# Patient Record
Sex: MALE | Race: WHITE
[De-identification: names, ages, dates, MRNs, and addresses within clinical notes are randomized per-mention and may not be internally consistent; named-entity substitution may affect disease eponyms.]

---

## 2017-05-23 ENCOUNTER — HOSPITAL ENCOUNTER (OUTPATIENT)
Dept: HOSPITAL 56 - MW.SDS | Age: 77
Discharge: HOME | End: 2017-05-23
Attending: UROLOGY
Payer: OTHER GOVERNMENT

## 2017-05-23 VITALS — DIASTOLIC BLOOD PRESSURE: 70 MMHG | SYSTOLIC BLOOD PRESSURE: 138 MMHG

## 2017-05-23 DIAGNOSIS — C67.9: Primary | ICD-10-CM

## 2017-05-23 PROCEDURE — 52235 CYSTOSCOPY AND TREATMENT: CPT

## 2017-05-23 NOTE — PCM.PREANE
Preanesthetic Assessment





- Anesthesia/Transfusion/Family Hx


Anesthesia History: Prior Anesthesia Without Reaction


Family History of Anesthesia Reaction: No


Transfusion History: No Prior Transfusion(s)





- Review of Systems


General: No Symptoms


Pulmonary: No Symptoms


Cardiovascular: No Symptoms


Gastrointestinal: No symptoms


Neurological: No Symptoms


Other: Reports: None





- Physical Assessment


NPO Status Date: 05/22/17


NPO Status Time: 22:30


O2 Sat by Pulse Oximetry: 97


Respiratory Rate: 16


Vital Signs: 





 Last Vital Signs











Temp  36.8 C   05/23/17 10:20


 


Pulse  60   05/23/17 10:20


 


Resp  16   05/23/17 10:20


 


BP  141/66 H  05/23/17 10:20


 


Pulse Ox  97   05/23/17 10:20











Height: 1.78 m


Weight: 103.419 kg


ASA Class: 2


Mental Status: Alert & Oriented x3


Dentition: Reports: Normal Dentition


Lungs: Clear to auscultation, Normal respiratory effort


Cardiovascular: Regular Rate, Regular Rhythm





- Allergies


Allergies/Adverse Reactions: 


 Allergies











Allergy/AdvReac Type Severity Reaction Status Date / Time


 


No Known Allergies Allergy   Verified 05/18/17 11:41














- Blood


Blood Available: No





- Anesthesia Plan


Pre-Op Medication Ordered: None





- Acknowledgements


Anesthesia Type Planned: General Anesthesia


Pt an Appropriate Candidate for the Planned Anesthesia: Yes


Alternatives and Risks of Anesthesia Discussed w Pt/Guardian: Yes


Pt/Guardian Understands and Agrees with Anesthesia Plan: Yes


Additional Comments: 





problem list:  htn, glaucoma, GERD, DALI (on CPAP x 1 month), seasonal allergies.





PreAnesthesia Questionnaire





- Past Health History


Medical/Surgical History: Denies Medical/Surgical History


HEENT History: Reports: Allergic Rhinitis, Glaucoma


Other HEENT History: wears glasses


Cardiovascular History: Reports: Hypertension


Respiratory History: Reports: None


Gastrointestinal History: Reports: None


Other Genitourinary History: current bladder tumor


Musculoskeletal History: Reports: Fracture


Other Musculoskeletal History: hx of fx thumb


Neurological History: Reports: None


Psychiatric History: Reports: None


Endocrine/Metabolic History: Reports: Obesity/BMI 30+


Hematologic History: Reports: None


Immunologic History: Reports: None


Oncologic (Cancer) History: Reports: None


Dermatologic History: Reports: None





- Past Surgical History


Head Surgeries/Procedures: Reports: None


HEENT Surgical History: Reports: None


Cardiovascular Surgical History: Reports: None


Respiratory Surgical History: Reports: None


GI Surgical History: Reports: Appendectomy


Male  Surgical History: Reports: None


Endocrine Surgical History: Reports: None


Neurological Surgical History: Reports: None


Musculoskeletal Surgical History: Reports: None


Oncologic Surgical History: Reports: None


Dermatological Surgical History: Reports: None





- SUBSTANCE USE


Smoking Status *Q: Former Smoker


Second Hand Smoke Exposure: No


Days Per Week of Alcohol Use: 0


Recreational Drug Use History: No





- HOME MEDS


Home Medications: 


 Home Meds





Ranitidine HCl [Ranitidine] 150 mg PO BEDTIME 06/04/15 [History]


Triamterene/Hydrochlorothiazid [Triamterene-HCTZ 37.5-25 MG] 1 each PO DAILY 06/

04/15 [History]


amLODIPine [Norvasc] 10 mg PO DAILY 06/04/15 [History]


Fish Oil/Omega-3 Fatty Acids [Fish Oil 1,000 MG] 1 gm PO BID 05/18/17 [History]


Fluticasone Propionate [Flonase Allergy Relief] 1 spray NASBOTH DAILY 05/18/17 [

History]


Latanoprost [Xalatan 0.005% Ophth Soln] 1 drop EYEBOTH BEDTIME 05/18/17 [History

]


traZODone HCl [Trazodone HCl] 50 mg PO BEDTIME PRN 05/18/17 [History]











- CURRENT (IN HOUSE) MEDS


Current Meds: 





 Current Medications





Lactated Ringer's (Ringers, Lactated)  1,000 mls @ 100 mls/hr IV ASDIRECTED [facility]


   Last Admin: 05/23/17 10:38 Dose:  100 mls/hr





Discontinued Medications





Cefazolin Sodium/Dextrose 2 gm (/ Premix)  50 mls @ 100 mls/hr IV ONCALL ONE


   Stop: 05/23/17 00:34

## 2017-05-23 NOTE — PCM.POSTAN
POST ANESTHESIA ASSESSMENT





- MENTAL STATUS


Mental Status: alert, oriented





- RESPIRATORY


Respiratory Status: respiratory rate WNL, airway patent





- CARDIOVASCULAR


CV Status: pulse rate WNL, blood pressure stable





- GASTROINTESTINAL


GI Status: no symptoms





- POST OP HYDRATION


Hydration Status: adequate & stable

## 2017-05-23 NOTE — OR
SURGEON:

Hitesh Ash M.D.

 

DATE OF PROCEDURE:  05/23/2017

 

PREOPERATIVE DIAGNOSIS:

Transitional cell carcinoma of the bladder.

 

POSTOPERATIVE DIAGNOSIS:

Transitional cell carcinoma of the bladder.

 

OPERATION:

TURBT.

 

DESCRIPTION OF PROCEDURE:

The patient was given general anesthesia, placed in dorsal lithotomy position,

prepped and draped in sterile drapes.  The 26-Qatari resectoscope was introduced

in the bladder without difficulty.  The tumor was resected in its entirety

including some of the muscle base.  The tumor spread over about 1/4th maybe the

circumference of the bladder neck.  It was affecting the bladder neck, but

mostly behind it on the left side.  It extended about 2 cm across in some

places.  The tumor was resected in its entirety.  Specimen was submitted for

additional investigation of the muscle.  With that done, all bleeding points

were fulgurated.  All specimen was submitted and an 18-Qatari Laboy catheter was

placed in the bladder.  Estimated blood loss under 50 mL.

 

PLAN:

He will be sent home.  He was instructed to remove the catheter in 3 days.  I

will see him again in one week.

 

 

SHAYE / ARLETTE

DD:  05/23/2017 15:00:39

DT:  05/23/2017 20:31:29

Job #:  [id number]

## 2017-11-17 ENCOUNTER — HOSPITAL ENCOUNTER (OUTPATIENT)
Dept: HOSPITAL 56 - MW.SDS | Age: 77
Discharge: HOME | End: 2017-11-17
Attending: SURGERY
Payer: MEDICARE

## 2017-11-17 VITALS — DIASTOLIC BLOOD PRESSURE: 78 MMHG | SYSTOLIC BLOOD PRESSURE: 143 MMHG

## 2017-11-17 DIAGNOSIS — Z87.891: ICD-10-CM

## 2017-11-17 DIAGNOSIS — Z85.51: ICD-10-CM

## 2017-11-17 DIAGNOSIS — H02.834: ICD-10-CM

## 2017-11-17 DIAGNOSIS — G47.30: ICD-10-CM

## 2017-11-17 DIAGNOSIS — H02.831: Primary | ICD-10-CM

## 2017-11-17 DIAGNOSIS — E66.9: ICD-10-CM

## 2017-11-17 DIAGNOSIS — K21.9: ICD-10-CM

## 2017-11-17 DIAGNOSIS — Z79.51: ICD-10-CM

## 2017-11-17 DIAGNOSIS — I10: ICD-10-CM

## 2017-11-17 DIAGNOSIS — Z98.890: ICD-10-CM

## 2017-11-17 DIAGNOSIS — Z79.899: ICD-10-CM

## 2017-11-17 PROCEDURE — 93005 ELECTROCARDIOGRAM TRACING: CPT

## 2017-11-17 PROCEDURE — 15823 BLEPHARP UPR EYELID XCSV SKN: CPT

## 2017-11-17 NOTE — PCM.POSTAN
POST ANESTHESIA ASSESSMENT





- MENTAL STATUS


Mental Status: Alert, Oriented





- VITAL SIGNS


Pulse Rate: 71


SaO2: 95 (room air)


Resp Rate: 18


Blood Pressure: 119/86





- RESPIRATORY


Respiratory Status: Respiratory Rate WNL, Airway Patent, O2 Saturation Stable





- CARDIOVASCULAR


CV Status: Pulse Rate WNL, Blood Pressure Stable





- GASTROINTESTINAL


GI Status: No Symptoms





- PAIN


Pain Score: 0





- POST OP HYDRATION


Hydration Status: Adequate & Stable

## 2017-11-17 NOTE — PCM.PREANE
Preanesthetic Assessment





- Anesthesia/Transfusion/Family Hx


Anesthesia History: Prior Anesthesia Without Reaction


Transfusion History: No Prior Transfusion(s)





- Review of Systems


General: No Symptoms


Pulmonary: No Symptoms


Cardiovascular: No Symptoms


Gastrointestinal: No Symptoms


Neurological: No Symptoms


Other: Reports: None





- Physical Assessment


NPO Status Date: 11/17/17


NPO Status Time: 00:01


Height: 5 ft 10 in


Weight: 102.058 kg


ASA Class: 2


Mental Status: Alert & Oriented x3


Airway Class: Mallampati = 2


Dentition: Reports: Normal Dentition


Thyro-Mental Finger Breadths: 3


Mouth Opening Finger Breadths: 3


ROM/Head Extension: Full


Lungs: Clear to Auscultation, Normal Respiratory Effort


Cardiovascular: Regular Rate, Regular Rhythm





- Allergies


Allergies/Adverse Reactions: 


 Allergies











Allergy/AdvReac Type Severity Reaction Status Date / Time


 


No Known Allergies Allergy   Verified 05/18/17 11:41














- Acknowledgements


Anesthesia Type Planned: General Anesthesia, MAC


Pt an Appropriate Candidate for the Planned Anesthesia: Yes


Alternatives and Risks of Anesthesia Discussed w Pt/Guardian: Yes


Pt/Guardian Understands and Agrees with Anesthesia Plan: Yes





PreAnesthesia Questionnaire





- Past Health History


Medical/Surgical History: Denies Medical/Surgical History


HEENT History: Reports: Allergic Rhinitis, Glaucoma


Other HEENT History: wears glasses


Cardiovascular History: Reports: Hypertension


Respiratory History: Reports: Sleep Apnea


Other Respiratory History: does not use CPAP


Gastrointestinal History: Reports: GERD


Other Genitourinary History: hx bladder cancer


Musculoskeletal History: Reports: Fracture


Other Musculoskeletal History: hx of fx thumb, left shoulder pain


Neurological History: Reports: None


Psychiatric History: Reports: None


Endocrine/Metabolic History: Reports: Obesity/BMI 30+


Hematologic History: Reports: None


Immunologic History: Reports: None


Oncologic (Cancer) History: Reports: Bladder


Dermatologic History: Reports: None





- Past Surgical History


Head Surgeries/Procedures: Reports: None


HEENT Surgical History: Reports: None


Cardiovascular Surgical History: Reports: None


Respiratory Surgical History: Reports: None


GI Surgical History: Reports: Appendectomy


Male  Surgical History: Reports: TURBT-Transurethral Resection of Bladder 

Tumor


Endocrine Surgical History: Reports: None


Neurological Surgical History: Reports: None


Musculoskeletal Surgical History: Reports: None


Oncologic Surgical History: Reports: None


Dermatological Surgical History: Reports: None





- SUBSTANCE USE


Smoking Status *Q: Former Smoker


Second Hand Smoke Exposure: No


Days Per Week of Alcohol Use: 0


Recreational Drug Use History: No





- HOME MEDS


Home Medications: 


 Home Meds





Ranitidine HCl [Ranitidine] 150 mg PO BEDTIME 06/04/15 [History]


Triamterene/Hydrochlorothiazid [Triamterene-HCTZ 37.5-25 MG] 1 each PO DAILY 06/

04/15 [History]


amLODIPine [Norvasc] 10 mg PO DAILY 06/04/15 [History]


Fish Oil/Omega-3 Fatty Acids [Fish Oil 1,000 MG] 2 tab PO BID 05/18/17 [History]


Latanoprost [Xalatan 0.005% Ophth Soln] 1 drop EYEBOTH BEDTIME 05/18/17 [History

]


traZODone HCl [Trazodone HCl] 0.5 tab PO BEDTIME 05/18/17 [History]











- CURRENT (IN HOUSE) MEDS


Current Meds: 





 Current Medications





Cefazolin Sodium/Dextrose 2 gm (/ Premix)  50 mls @ 100 mls/hr IV ONETIME ONE


   Stop: 11/17/17 09:29


Lactated Ringer's (Ringers, Lactated)  1,000 mls @ 125 mls/hr IV ASDIRECTED PRISCILLA


Tobramycin/Dexamethasone (Tobradex Ophth Susp)  2 ml EYEBOTH Q4H PRISCILLA





Discontinued Medications





Bupivacaine HCl/Epinephrine Bitart (Marcaine 0.25%/Epinephrine 1:200,000)  10 

ml INJECT ONETIME ONE


   Stop: 11/17/17 09:01


Bupivacaine HCl/Epinephrine Bitart (Sensorc Mpf 0.25%-Epi 1:954718) Confirm 

Administered Dose 30 mls @ as directed .ROUTE .STK-MED ONE


   Stop: 11/17/17 07:17


Tetracaine (Tetracaine 0.5% Ophth Soln) Confirm Administered Dose 15 ml .ROUTE 

.STK-MED ONE


   Stop: 11/17/17 08:22


Tobramycin/Dexamethasone (Tobradex Ophth Oint) Confirm Administered Dose 3.5 gm 

.ROUTE .STK-MED ONE


   Stop: 11/17/17 08:22

## 2017-11-21 NOTE — OR
SURGEON:

HOLLAND KIDD MD

 

DATE OF PROCEDURE:  11/17/2017

 

PREOPERATIVE DIAGNOSIS:

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

 

POSTOPERATIVE DIAGNOSIS:

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

 

PROCEDURE:

Bilateral blepharoplasties for excess skin weighing down lids.

 

ASSISTANT:

CAYETANO Garcia.

 

INDICATIONS:

Mr. Brewster is a 77-year-old male with bilateral upper lid dermatochalasis.

Risks and benefits of excision of the excess skin were discussed with him.  He

has very good levator function on excursion.  Risks were including, but not

limited to, bleeding, infection, damage to underlying or overlying structures,

possible need for future interventions, possible scarring.  He was in agreement

to proceed.

 

PROCEDURE IN DETAIL:

After informed consent was obtained and placed on the chart, the patient was

brought to the operating theater and laid in the supine position.  After

adequate local MAC anesthetic was obtained, he was prepped and draped, and time-

out was completed to confirm side and site.

 

Once adequately confirmed, attention was then paid to marking of the upper

eyelid skin excess.  He had a significant amount of excess skin and 1.5 cm was

removed bilaterally.  Once adequately removed, marked, and pinch test had been

completed to ensure appropriate tension upon removal.  The area was anesthetized

with 0.25% Marcaine with epinephrine in a field block.  Once adequately

anesthetized, the area was excised in an ellipse in standard fashion and

meticulous hemostasis was obtained.  Hemostasis of the underlying musculature

for contraction was also completed.  Once this was completed, the wound was

closed using a deep Monocryl stitch and a running 6-0 Prolene for the skin.

Steri-strips was placed.  The area was dressed with TobraDex liquid.  The

patient tolerated the procedure well.  All counts and needles were correct at

the end of the case.

 

FOLLOWUP INSTRUCTIONS:

The patient will see us in clinic in 7 to 10 days for suture removal, sooner if

there are any problems, questions, or concerns.

 

 

HEGGTVALENTINA / ARLETTE

DD:  11/21/2017 16:51:44

DT:  11/21/2017 20:55:33

Job #:  [id number]

## 2017-11-21 NOTE — PCM.OPNOTE
- General Post-Op/Procedure Note


Date of Surgery/Procedure: 11/17/17


Operative Procedure(s): bilateral blepharoplasties for excess skin


Pre Op Diagnosis: dermatochalasis bilateral upper lids


Post-Op Diagnosis: Same


Anesthesia Technique: Local, MAC


Primary Surgeon: Carri Delgado


Assistant: Kaci Parra


Complications: None


Condition: Good


Free Text/Narrative:: 





[id number]

## 2018-01-23 ENCOUNTER — HOSPITAL ENCOUNTER (OUTPATIENT)
Dept: HOSPITAL 56 - MW.SDS | Age: 78
Discharge: HOME | End: 2018-01-23
Attending: UROLOGY
Payer: OTHER GOVERNMENT

## 2018-01-23 VITALS — DIASTOLIC BLOOD PRESSURE: 73 MMHG | SYSTOLIC BLOOD PRESSURE: 154 MMHG

## 2018-01-23 DIAGNOSIS — I10: ICD-10-CM

## 2018-01-23 DIAGNOSIS — D41.4: Primary | ICD-10-CM

## 2018-01-23 DIAGNOSIS — Z79.899: ICD-10-CM

## 2018-01-23 DIAGNOSIS — Z90.49: ICD-10-CM

## 2018-01-23 PROCEDURE — 52240 CYSTOSCOPY AND TREATMENT: CPT

## 2018-01-23 NOTE — PCM.PREANE
Preanesthetic Assessment





- Anesthesia/Transfusion/Family Hx


Anesthesia History: Prior Anesthesia Without Reaction


Family History of Anesthesia Reaction: No


Transfusion History: No Prior Transfusion(s)





- Review of Systems


General: No Symptoms


Pulmonary: No Symptoms


Cardiovascular: No Symptoms


Gastrointestinal: No Symptoms


Neurological: No Symptoms


Other: Reports: None





- Physical Assessment


NPO Status Date: 01/22/18


Height: 1.78 m


Weight: 107.048 kg


ASA Class: 2


Mental Status: Alert & Oriented x3


Airway Class: Mallampati = 1


Dentition: Reports: Crown(s)


ROM/Head Extension: Full


Lungs: Clear to Auscultation, Normal Respiratory Effort


Cardiovascular: Regular Rate, Regular Rhythm





- Allergies


Allergies/Adverse Reactions: 


 Allergies











Allergy/AdvReac Type Severity Reaction Status Date / Time


 


No Known Allergies Allergy   Verified 05/18/17 11:41














- Anesthesia Plan


Pre-Op Medication Ordered: None





- Acknowledgements


Anesthesia Type Planned: General Anesthesia


Pt an Appropriate Candidate for the Planned Anesthesia: Yes


Alternatives and Risks of Anesthesia Discussed w Pt/Guardian: Yes


Pt/Guardian Understands and Agrees with Anesthesia Plan: Yes


Additional Comments: 





PMH: significant GERD, HTN, Glaucoma





PreAnesthesia Questionnaire





- Past Health History


Medical/Surgical History: Denies Medical/Surgical History


HEENT History: Reports: Allergic Rhinitis, Glaucoma


Other HEENT History: wears glasses


Cardiovascular History: Reports: Hypertension


Respiratory History: Reports: Sleep Apnea


Other Respiratory History: does not use CPAP


Gastrointestinal History: Reports: GERD


Genitourinary History: Reports: Other (See Below)


Other Genitourinary History: hx bladder cancer


Musculoskeletal History: Reports: Arthritis, Fracture


Other Musculoskeletal History: hx of fx thumb, left shoulder pain


Neurological History: Reports: None


Psychiatric History: Reports: None


Endocrine/Metabolic History: Reports: Obesity/BMI 30+


Hematologic History: Reports: None


Immunologic History: Reports: None


Oncologic (Cancer) History: Reports: Bladder


Dermatologic History: Reports: None





- Past Surgical History


Head Surgeries/Procedures: Reports: None


HEENT Surgical History: Reports: None, Cataract Surgery


Other HEENT Surgeries/Procedures: hx blepharoplasty and cataract surgery


Cardiovascular Surgical History: Reports: None


Respiratory Surgical History: Reports: None


GI Surgical History: Reports: Appendectomy


Male  Surgical History: Reports: TURBT-Transurethral Resection of Bladder 

Tumor


Endocrine Surgical History: Reports: None


Neurological Surgical History: Reports: None


Musculoskeletal Surgical History: Reports: None


Oncologic Surgical History: Reports: None


Dermatological Surgical History: Reports: None





- SUBSTANCE USE


Smoking Status *Q: Former Smoker


Second Hand Smoke Exposure: No


Days Per Week of Alcohol Use: 0


Recreational Drug Use History: No





- HOME MEDS


Home Medications: 


 Home Meds





Ranitidine HCl [Ranitidine] 150 mg PO BEDTIME 06/04/15 [History]


Triamterene/Hydrochlorothiazid [Triamterene-HCTZ 37.5-25 MG] 1 each PO DAILY 06/

04/15 [History]


amLODIPine [Norvasc] 10 mg PO DAILY 06/04/15 [History]


Fish Oil/Omega-3 Fatty Acids [Fish Oil 1,000 MG] 2 tab PO BID 05/18/17 [History]


Latanoprost [Xalatan 0.005% Ophth Soln] 1 drop EYEBOTH BEDTIME 05/18/17 [History

]


traZODone HCl [Trazodone HCl] 0.5 tab PO BEDTIME 05/18/17 [History]











- CURRENT (IN HOUSE) MEDS


Current Meds: 





 Current Medications





Lactated Ringer's (Ringers, Lactated)  1,000 mls @ 100 mls/hr IV ASDIRECTED PRISCILLA


Sodium Chloride (Saline Flush)  10 ml FLUSH ASDIRECTED PRN


   PRN Reason: Keep Vein Open


Sodium Chloride (Saline Flush)  2.5 ml FLUSH ASDIRECTED PRN


   PRN Reason: Keep Vein Open





Discontinued Medications





Cefazolin Sodium/Dextrose 2 gm (/ Premix)  50 mls @ 100 mls/hr IV ONETIME ONE


   Stop: 01/23/18 07:29

## 2018-02-01 NOTE — OR
SURGEON:

Hitesh Ash M.D.

 

DATE OF PROCEDURE:  01/23/2018

 

PREOPERATIVE DIAGNOSIS:

Multiple papillary bladder tumors.

 

POSTOPERATIVE DIAGNOSIS:

Multiple papillary bladder tumors.

 

OPERATION PERFORMED:

TURBT.

 

DESCRIPTION OF PROCEDURE:

The patient was given general anesthesia, was placed in dorsal lithotomy

position, prepped and draped in sterile drapes.  A #26 resectoscope was

introduced in the bladder without difficulty.  All the tumors were resected, and

the specimen was submitted.  The patient tolerated the procedure well and was

moved to recovery room in good condition.

 

 

SHAYE / ARLETTE

DD:  02/01/2018 16:48:48

DT:  02/01/2018 21:12:22

Job #:  [id number]

## 2018-03-06 NOTE — OR
SURGEON:

Hitesh Ash M.D.

 

DATE OF PROCEDURE:  01/23/2018

 

ADDENDUM:

The size of the tumor was large, and the margins were clear.

 

 

SHAYE / ARLETTE

DD:  03/06/2018 10:23:10

DT:  03/06/2018 12:54:13

Job #:  [id number]

## 2018-04-26 ENCOUNTER — HOSPITAL ENCOUNTER (OUTPATIENT)
Dept: HOSPITAL 56 - MW.SDS | Age: 78
Discharge: HOME | End: 2018-04-26
Attending: SURGERY
Payer: MEDICARE

## 2018-04-26 VITALS — SYSTOLIC BLOOD PRESSURE: 140 MMHG | DIASTOLIC BLOOD PRESSURE: 71 MMHG

## 2018-04-26 DIAGNOSIS — Z79.899: ICD-10-CM

## 2018-04-26 DIAGNOSIS — I10: ICD-10-CM

## 2018-04-26 DIAGNOSIS — K21.9: ICD-10-CM

## 2018-04-26 DIAGNOSIS — M19.012: ICD-10-CM

## 2018-04-26 DIAGNOSIS — E66.9: ICD-10-CM

## 2018-04-26 DIAGNOSIS — Z98.890: ICD-10-CM

## 2018-04-26 DIAGNOSIS — C67.9: Primary | ICD-10-CM

## 2018-04-26 DIAGNOSIS — J30.9: ICD-10-CM

## 2018-04-26 DIAGNOSIS — G47.30: ICD-10-CM

## 2018-04-26 DIAGNOSIS — Z90.49: ICD-10-CM

## 2018-04-26 DIAGNOSIS — Z87.891: ICD-10-CM

## 2018-04-26 PROCEDURE — 36561 INSERT TUNNELED CV CATH: CPT

## 2018-04-26 PROCEDURE — 76000 FLUOROSCOPY <1 HR PHYS/QHP: CPT

## 2018-04-26 PROCEDURE — 71045 X-RAY EXAM CHEST 1 VIEW: CPT

## 2018-04-26 NOTE — PCM.PREANE
Preanesthetic Assessment





- Anesthesia/Transfusion/Family Hx


Anesthesia History: Prior Anesthesia Without Reaction


Family History of Anesthesia Reaction: No


Transfusion History: No Prior Transfusion(s)





- Review of Systems


General: No Symptoms


Pulmonary: No Symptoms


Cardiovascular: No Symptoms


Gastrointestinal: No Symptoms


Neurological: No Symptoms


Other: Reports: None





- Physical Assessment


NPO Status Date: 04/25/18


NPO Status Time: 20:00


O2 Sat by Pulse Oximetry: 95


Respiratory Rate: 16


Vital Signs: 





 Last Vital Signs











Temp  36.1 C   04/26/18 08:50


 


Pulse  98   04/26/18 08:50


 


Resp  16   04/26/18 08:50


 


BP  164/91 H  04/26/18 08:50


 


Pulse Ox  95   04/26/18 08:50











Height: 1.75 m


Weight: 97.976 kg


ASA Class: 2


Mental Status: Alert & Oriented x3


Dentition: Reports: Normal Dentition


ROM/Head Extension: Full


Lungs: Clear to Auscultation, Normal Respiratory Effort


Cardiovascular: Regular Rate, Regular Rhythm





- Allergies


Allergies/Adverse Reactions: 


 Allergies











Allergy/AdvReac Type Severity Reaction Status Date / Time


 


No Known Allergies Allergy   Verified 04/24/18 10:18














- Anesthesia Plan


Pre-Op Medication Ordered: None





- Acknowledgements


Anesthesia Type Planned: MAC


Pt an Appropriate Candidate for the Planned Anesthesia: Yes


Alternatives and Risks of Anesthesia Discussed w Pt/Guardian: Yes


Pt/Guardian Understands and Agrees with Anesthesia Plan: Yes





PreAnesthesia Questionnaire





- Past Health History


Medical/Surgical History: Denies Medical/Surgical History


HEENT History: Reports: Cataract, Glaucoma


Other HEENT History: wears glasses


Cardiovascular History: Reports: Hypertension


Respiratory History: Reports: Sleep Apnea


Other Respiratory History: does not use CPAP


Gastrointestinal History: Reports: GERD


Genitourinary History: Reports: Other (See Below)


Other Genitourinary History: hx of bladder tumor


Musculoskeletal History: Reports: Arthritis, Fracture


Other Musculoskeletal History: hx of fx thumb, left shoulder pain


Neurological History: Reports: None


Psychiatric History: Reports: None


Endocrine/Metabolic History: Reports: Obesity/BMI 30+


Hematologic History: Reports: None


Immunologic History: Reports: None


Oncologic (Cancer) History: Reports: Bladder


Dermatologic History: Reports: None





- Past Surgical History


Head Surgeries/Procedures: Reports: None


HEENT Surgical History: Reports: Cataract Surgery, Other (See Below)


Other HEENT Surgeries/Procedures: hx of Blepharoplasty


Cardiovascular Surgical History: Reports: None


Respiratory Surgical History: Reports: None


GI Surgical History: Reports: Appendectomy


Male  Surgical History: Reports: TURBT-Transurethral Resection of Bladder 

Tumor


Endocrine Surgical History: Reports: None


Neurological Surgical History: Reports: None


Musculoskeletal Surgical History: Reports: None


Oncologic Surgical History: Reports: None


Dermatological Surgical History: Reports: None





- SUBSTANCE USE


Smoking Status *Q: Former Smoker


Tobacco Use Within Last Twelve Months: No


Second Hand Smoke Exposure: No


Days Per Week of Alcohol Use: 0


Recreational Drug Use History: No





- HOME MEDS


Home Medications: 


 Home Meds





Ranitidine HCl [Ranitidine] 150 mg PO BEDTIME 06/04/15 [History]


Triamterene/Hydrochlorothiazid [Triamterene-HCTZ 37.5-25 MG] 1 each PO DAILY 06/

04/15 [History]


amLODIPine [Norvasc] 10 mg PO DAILY 06/04/15 [History]


Fish Oil/Omega-3 Fatty Acids [Fish Oil 1,000 MG] 1 tab PO BID 05/18/17 [History]


Latanoprost [Xalatan 0.005% Ophth Soln] 1 drop EYEBOTH BEDTIME 05/18/17 [History

]


traZODone HCl [Trazodone HCl] 25 mg PO BEDTIME PRN 05/18/17 [History]











- CURRENT (IN HOUSE) MEDS


Current Meds: 





 Current Medications





Lactated Ringer's (Ringers, Lactated)  1,000 mls @ 125 mls/hr IV ASDIRECTED PRISCILLA


Sodium Chloride (Saline Flush)  10 ml FLUSH ASDIRECTED PRN


   PRN Reason: Keep Vein Open


Sodium Chloride (Saline Flush)  2.5 ml FLUSH ASDIRECTED PRN


   PRN Reason: Keep Vein Open





Discontinued Medications





Cefazolin Sodium/Dextrose 2 gm (/ Premix)  50 mls @ 100 mls/hr IV ONETIME ONE


   Stop: 04/25/18 09:19

## 2018-04-26 NOTE — PCM.OPNOTE
- General Post-Op/Procedure Note


Date of Surgery/Procedure: 04/26/18


Operative Procedure(s): Right internal jugular port a cath placement


Findings: 


Right internal jugular port a cath placement 


Pre Op Diagnosis: Transitional cell carcinoma


Post-Op Diagnosis: same


Anesthesia Technique: General LMA


Primary Surgeon: Iris Salmon


Fluid Replacement, Intraop: 800


EBL in mLs: 10


Condition: Good

## 2018-04-26 NOTE — [id number]
Post Anesthesia Note





- EVALUATION WITHIN 48HRS OF ANESTHETIC


Vital Signs in Normal Range: Yes


Patient Participated in Evaluation: Yes


Respiratory Function Stable: Yes


Airway Patent: Yes


Cardiovascular Function Stable: Yes


Hydration Status Stable: Yes


Pain Control Satisfactory: Yes


Nausea and Vomiting Control Satisfactory: Yes


Mental Status Recovered: Yes


Resp Rate: 16

## 2018-04-26 NOTE — CR
EXAMINATION: Portable chest radiograph.

 

HISTORY: Port-A-Cath placement.

 

FINDINGS: 

The trachea is midline. The cardiomediastinal silhouette is within normal limits. No pulmonary infilt
rates, effusions or pneumothorax. Right-sided portacatheter noted with tip in the distal SVC. Mild le
ft basilar atelectasis.

 

Medial left scapular margin is not well identified.

 

IMPRESSION: 

Right-sided portacatheter in good position.

## 2018-04-26 NOTE — OR
SURGEON:

ALICIA MATA MD

 

DATE OF PROCEDURE:  04/26/2018

 

PREOPERATIVE DIAGNOSIS:

Transitional cell carcinoma of the bladder.

 

POSTOPERATIVE DIAGNOSIS:

Transitional cell carcinoma of the bladder.

 

PROCEDURE PERFORMED:

Right internal jugular Port-A-Cath placement.

 

ANESTHESIA:

General LMA.

 

FLUIDS:

800 mL crystalloid.

 

ESTIMATED BLOOD LOSS:

10 mL.

 

FINDINGS:

Right internal jugular Port-A-Cath placement.

 

COMPLICATIONS:

None.

 

INDICATIONS:

The patient is a 77-year-old male, who presents with transitional cell

carcinoma.  He has undergone surgery for this and is now undergoing radiation

and chemotherapy treatment.  He is in need of permanent access.  We discussed

the need for a Port-A-Cath.  I explained the procedure, expected perioperative

course, and risks including bleeding, infection, damage to surrounding

structures including hemothorax, pneumothorax, or hemopericardium.  The patient

verbalized understanding and wishes to proceed.

 

PROCEDURE IN DETAIL:

The patient was brought into the operating room and placed on the OR table in

supine position.  A time-out was completed verifying the patient's name, age,

date of birth, allergies, and procedure to be performed.  General LMA anesthesia

was induced.  An ultrasound probe was used to verify the vascular anatomy of the

right side of the neck.  I identified the right internal jugular vein and the

associated right carotid artery.  The neck and right chest were prepped and

draped in usual standard fashion.  I then reverified my anatomy using a sterile

ultrasound probe.  The area overlying the right internal jugular vein was

anesthetized with 1% lidocaine plain.  I anesthetized the catheter tract and

port site on the anterior right chest as well.  A large bore needle was then

placed under direct visualization into the right internal jugular vein.  A brisk

return of venous blood was noted.  A guidewire was then placed down the needle

into the superior vena cava.  The needle was then removed, and the placement of

the guidewire was verified using x-ray.  I then turned my attention to the right

anterior chest.  A 3 cm incision was made along the right anterior chest wall 2

fingerbreadths below the lateral clavicle.  Cautery was used to dissect down the

level of chest wall and create a subcutaneous pocket.  Hemostasis was achieved

with cautery.  The catheter tubing was then tunneled from the subcutaneous port

site up to the insertion site of the guidewire on the right neck.  A vascular

sheath and dilator were then placed over the guidewire and then dilated over the

guidewire under fluoroscopic guidance.  Once this was in an appropriate

position, the internal dilator and guidewire were removed leaving the vascular

sheath in place.  The catheter tubing was then placed through the vascular

sheath into the superior vena cava.  The vascular sheath was then peeled away.

Fluoroscopic guidance was used to ensure that the tip of the catheter was in the

SVC.  The catheter tubing was aspirated and a good return of venous blood was

noted.  The catheter tubing was then flushed with injectable saline.  The

catheter tubing was then trimmed to size and placed on the Port-A-Cath device.

The Port-A-Cath was then placed in the subcutaneous pocket and secured using 3-0

Prolene stitches on either side of it.  The Port-A-Cath device was then

aspirated with a good return of venous blood.  It was locked with 3 mL of

heparinized saline.  The site was then closed with a running 3-0 Vicryl stitch

in the subcutaneous fat layer and the skin was closed with a running 4-0

Monocryl stitch.  Steri-Strips and sterile dressings were applied.  The neck

insertion site was then closed with an interrupted 4-0 Monocryl.  Steri-Strips

and sterile dressings were applied.  The patient tolerated the procedure well

and was taken to PACU in stable condition.  A postoperative chest x-ray showed 
good placement. 

 

 

FERNY CHONG

DD:  04/26/2018 11:48:51

DT:  04/26/2018 14:57:48

Job #:  [id number]

[email or web address]

## 2018-05-11 ENCOUNTER — HOSPITAL ENCOUNTER (EMERGENCY)
Dept: HOSPITAL 56 - MW.ED | Age: 78
LOS: 1 days | Discharge: NURSING, REHAB OR LONG-TERM CARE | End: 2018-05-12
Payer: MEDICARE

## 2018-05-11 DIAGNOSIS — I10: ICD-10-CM

## 2018-05-11 DIAGNOSIS — E66.9: ICD-10-CM

## 2018-05-11 DIAGNOSIS — K21.9: ICD-10-CM

## 2018-05-11 DIAGNOSIS — J05.10: Primary | ICD-10-CM

## 2018-05-11 DIAGNOSIS — D70.9: ICD-10-CM

## 2018-05-11 DIAGNOSIS — R50.81: ICD-10-CM

## 2018-05-11 DIAGNOSIS — Z79.899: ICD-10-CM

## 2018-05-11 PROCEDURE — 85610 PROTHROMBIN TIME: CPT

## 2018-05-11 PROCEDURE — 70360 X-RAY EXAM OF NECK: CPT

## 2018-05-11 PROCEDURE — 86901 BLOOD TYPING SEROLOGIC RH(D): CPT

## 2018-05-11 PROCEDURE — 86850 RBC ANTIBODY SCREEN: CPT

## 2018-05-11 PROCEDURE — 99285 EMERGENCY DEPT VISIT HI MDM: CPT

## 2018-05-11 PROCEDURE — 87186 SC STD MICRODIL/AGAR DIL: CPT

## 2018-05-11 PROCEDURE — 83735 ASSAY OF MAGNESIUM: CPT

## 2018-05-11 PROCEDURE — 81001 URINALYSIS AUTO W/SCOPE: CPT

## 2018-05-11 PROCEDURE — 80053 COMPREHEN METABOLIC PANEL: CPT

## 2018-05-11 PROCEDURE — 96368 THER/DIAG CONCURRENT INF: CPT

## 2018-05-11 PROCEDURE — 83605 ASSAY OF LACTIC ACID: CPT

## 2018-05-11 PROCEDURE — 96375 TX/PRO/DX INJ NEW DRUG ADDON: CPT

## 2018-05-11 PROCEDURE — 36415 COLL VENOUS BLD VENIPUNCTURE: CPT

## 2018-05-11 PROCEDURE — 86900 BLOOD TYPING SEROLOGIC ABO: CPT

## 2018-05-11 PROCEDURE — C9113 INJ PANTOPRAZOLE SODIUM, VIA: HCPCS

## 2018-05-11 PROCEDURE — 87040 BLOOD CULTURE FOR BACTERIA: CPT

## 2018-05-11 PROCEDURE — 96365 THER/PROPH/DIAG IV INF INIT: CPT

## 2018-05-11 PROCEDURE — 71046 X-RAY EXAM CHEST 2 VIEWS: CPT

## 2018-05-11 PROCEDURE — 85025 COMPLETE CBC W/AUTO DIFF WBC: CPT

## 2018-05-11 PROCEDURE — 82150 ASSAY OF AMYLASE: CPT

## 2018-05-11 PROCEDURE — 83690 ASSAY OF LIPASE: CPT

## 2018-05-11 PROCEDURE — 87086 URINE CULTURE/COLONY COUNT: CPT

## 2018-05-11 PROCEDURE — 96361 HYDRATE IV INFUSION ADD-ON: CPT

## 2018-05-11 PROCEDURE — 87088 URINE BACTERIA CULTURE: CPT

## 2018-05-12 VITALS — SYSTOLIC BLOOD PRESSURE: 121 MMHG | DIASTOLIC BLOOD PRESSURE: 63 MMHG

## 2018-05-12 LAB
CHLORIDE SERPL-SCNC: 102 MMOL/L (ref 98–107)
SODIUM SERPL-SCNC: 138 MMOL/L (ref 136–148)

## 2018-05-12 NOTE — EDM.PDOC
ED HPI GENERAL MEDICAL PROBLEM





- General


Stated Complaint: POSSIBLE DEHYDRATION


Time Seen by Provider: 05/11/18 23:55





- History of Present Illness


INITIAL COMMENTS - FREE TEXT/NARRATIVE: 





HISTORY AND PHYSICAL:





History of present illness:


The patient is a 69-year-old male who arrives by EMS with a history of 

hypertension and transitional cell carcinoma of the bladder was followed in our 

oncology clinic and called for transport here because of feeling like he is 

dehydrated. The patient tells me he has had nausea and increased phlegm in his 

mouth which he thinks is thick throughout the day today since he had his 

platelet transfusion this morning. He says he has had intermittent nausea and 

some dry heaves but no vomiting. He says he has not been able to eat or drink 

anything throughout the day today which is why he thinks he is dehydrated. He 

had a low-grade temperature at home of 100.2 but he has not had a cough chest 

pain or shortness of breath and no abdominal pain diarrhea or urinary 

complaints. He tells me that for his cancer he has had 2 bladder surgeries but 

they were not able to completely remove the tumor and he is just started 

radiation therapy to his left scapula as well as chemotherapy but he is unsure 

of the last time he had chemotherapy. The patient tells me he is very thirsty 

but he says it hurts when he swallows and he has the phlegm which is why he is 

not swallowing fluids or food. He says that the discomfort with swallowing and 

the phlegm only started today. When asked him about his progressive decline in 

hemoglobin said that they did not tell him he would need to get another 

transfusion. The patient says that he had these symptoms when he was at the 

oncology center today but they were not as bad and they were aware of this.


The patient has had blood work here at our facility on May 2, May 7, May 8, and 

May 10 which showed a progressive decline in his hemoglobin from 14.5to 9.5 and 

his platelets from 201 to 15. The patient says he was contacted earlier today 

to come in and receive a transfusion of platelets which I see documented in the 

computer. Patient also had a low potassium on 7 May at 2.4 but corrected per 

labs from yesterday. Patient tells nursing that he had a fever while he was in 

the oncology center and was given medication for that. Patient also had recent 

imaging performed April 17 including CT scan of the chest abdomen and pelvis 

and a bone scan which I have reviewed. It demonstrates extensive metastases to 

the left scapular as well as a tiny focus in the left lateral 11th rib but no 

other masses appreciated.


Please note that the patient is currently on Diflucan therapy but he is not 

clear as to why he was placed on that.


Review of systems: 


As per history of present illness and below otherwise all systems reviewed and 

negative.





Past medical history: 


As per history of present illness and as reviewed below otherwise 

noncontributory.





Surgical history: 


As per history of present illness and as reviewed below otherwise 

noncontributory.





Social history: 


No reported history of drug or alcohol abuse.





Family history: 


As per history of present illness and as reviewed below otherwise 

noncontributory.





Physical exam:


General: Well-developed well-nourished overweight male who is nontoxic and 

speaking clearly but intermittently is dry heaving on my evaluation. Most of 

what he is producing looks saliva or phlegm-like and is clear and slightly 

white in color and mucousy in character. His speech is intact and it is not 

muffled hoarse and he is not breathless he is not drooling


HEENT: Atraumatic, normocephalic, pupils reactive, negative for conjunctival 

pallor or scleral icterus, mucous membranes moist, throat clear and there is no 

erythema swelling or abnormality seen in the posterior oropharynx and oral 

cavity, uvula is midline, there is no cervical adenopathy or nuchal rigidity,, 

neck supple, nontender, trachea midline.


Lungs: Clear to auscultation, breath sounds equal bilaterally, chest nontender. 

No work of breathing stridor or wheezing appreciated and good air exchange. At 

the posterior left scapular area there is diffuse erythema and a small area of 

excoriation consistent with his recent radiation therapy to this region


Heart: S1S2, regular, negative for clicks, rubs, or JVD.


Abdomen: Soft, nondistended, nontender. Negative for masses or 

hepatosplenomegaly. Negative for costovertebral tenderness. Bowel sounds are 

hypoactive and there is no tympany on percussion


Pelvis: Stable nontender.


Genitourinary: Deferred.


Rectal: Deferred.


Extremities: Atraumatic, negative for cords or calf pain. Neurovascular 

unremarkable. Full range of motion without defects or deficits


Neuro: Awake, alert, oriented. Cranial nerves II through XII unremarkable. 

Cerebellum unremarkable. Motor and sensory unremarkable throughout. Exam 

nonfocal.





Diagnostics:


CBC CMP amylase lipase INR UA urine culture blood cultures 2 lactic acid 

magnesium level soft tissue neck and chest x-ray





Therapeutics:


Port access IV fluids Zofran GI cocktail Protonix clindamycin Rocephin Decadron





Patient drink the GI cocktail and tolerated it without difficulty.





0203 and 0213: Case was discussed with Dr. Lenz the ENT on-call at [facility] in [place]. I have sent him a picture of the patient's x-ray and he has 

reviewed it. He does feel like the patient does have a mild case of 

epiglottitis but he does not feel that there is any airway compromise and he 

would like me to give Decadron and Rocephin and clindamycin and transfer the 

patient to [place]. He does not feel that the patient needs to be flown and can 

go by ground ambulance. I've also discussed this case with Dr. Jeffries in the ER 

who also accepts the patient


I discussed all these findings with the patient and the need for transfer and 

he is accepting of this. All cultures have been sent off and the patient is 

currently afebrile and resting comfortably handling his secretions. He is aware 

of the need for this emergent transfer. It is also noted in his workup that he 

has a UTI and a urine culture was sent and the [employer] that he is receiving

, Rocephin, should cover his UTI.


Receiving physicians at [facility] are also aware that I am unable to 

access the cancer center records to identify when the patient last had 

chemotherapy.





Critical care  time excluding procedures--31min


Impression: 


Epiglottitis


Neutropenic fever, pancytopenia, UTI


poor by mouth intake with dehydration, nausea and vomiting, history of 

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 

thrombocytopenia with transfusion








Definitive disposition and diagnosis as appropriate pending reevaluation and 

review of above.


  ** throat


Pain Score (Numeric/FACES): 7





- Related Data


 Allergies











Allergy/AdvReac Type Severity Reaction Status Date / Time


 


No Known Allergies Allergy   Verified 05/12/18 00:03











Home Meds: 


 Home Meds





Triamterene/Hydrochlorothiazid [Triamterene-HCTZ 37.5-25 MG] 1 each PO DAILY 06/

04/15 [History]


Fish Oil/Omega-3 Fatty Acids [Fish Oil 1,000 MG] 1 tab PO BID 05/18/17 [History]


Latanoprost [Xalatan 0.005% Ophth Soln] 1 drop EYEBOTH BEDTIME 05/18/17 [History

]


traZODone HCl [Trazodone HCl] 25 mg PO BEDTIME PRN 05/18/17 [History]


Fluconazole [Diflucan] 50 mg PO DAILY 05/12/18 [History]











Past Medical History





- Past Health History


Medical/Surgical History: Denies Medical/Surgical History


HEENT History: Reports: Cataract, Glaucoma


Other HEENT History: wears glasses


Cardiovascular History: Reports: Hypertension


Respiratory History: Reports: Sleep Apnea


Other Respiratory History: does not use CPAP


Gastrointestinal History: Reports: GERD


Genitourinary History: Reports: Other (See Below)


Other Genitourinary History: hx of bladder tumor


Musculoskeletal History: Reports: Arthritis, Fracture


Other Musculoskeletal History: hx of fx thumb, left shoulder pain


Neurological History: Reports: None


Psychiatric History: Reports: None


Endocrine/Metabolic History: Reports: Obesity/BMI 30+


Hematologic History: Reports: None


Immunologic History: Reports: None


Oncologic (Cancer) History: Reports: Bladder


Dermatologic History: Reports: None





- Past Surgical History


Head Surgeries/Procedures: Reports: None


HEENT Surgical History: Reports: Cataract Surgery, Other (See Below)


Other HEENT Surgeries/Procedures: hx of Blepharoplasty


Cardiovascular Surgical History: Reports: None


Respiratory Surgical History: Reports: None


GI Surgical History: Reports: Appendectomy


Male  Surgical History: Reports: TURBT-Transurethral Resection of Bladder 

Tumor


Endocrine Surgical History: Reports: None


Neurological Surgical History: Reports: None


Musculoskeletal Surgical History: Reports: None


Oncologic Surgical History: Reports: None


Dermatological Surgical History: Reports: None





Social & Family History





- Caffeine Use


Caffeine Use: Reports: Coffee, Soda





ED ROS GENERAL





- Review of Systems


Review Of Systems: ROS reveals no pertinent complaints other than HPI.





ED EXAM, GENERAL





- Physical Exam


Exam: See Below (See dictation)





Course





- Vital Signs


Last Recorded V/S: 


 Last Vital Signs











Temp  38.0 C   05/12/18 02:12


 


Pulse  90   05/12/18 02:12


 


Resp  18   05/12/18 02:12


 


BP  121/63   05/12/18 02:12


 


Pulse Ox  92 L  05/12/18 02:12














- Orders/Labs/Meds


Orders: 


 Active Orders 24 hr











 Category Date Time Status


 


 Communication Order [RC] STAT Care  05/12/18 01:35 Active


 


 Chest 2V [CR] Stat Exams  05/12/18 00:11 Taken


 


 Neck Soft Tissue [CR] Stat Exams  05/12/18 00:11 Taken


 


 CULTURE BLOOD [BC] Stat Lab  05/12/18 00:31 Received


 


 CULTURE BLOOD [BC] Stat Lab  05/12/18 00:45 Received


 


 CULTURE URINE [RM] Stat Lab  05/12/18 01:43 Ordered


 


 UA W/MICROSCOPIC [URIN] Stat Lab  05/12/18 01:38 Ordered


 


 Clindamycin Phosphate in D5W [Cleocin in D5W] 900 mg Med  05/12/18 02:24 

Ordered





 Premix Bag 1 bag   





 IV ONETIME   


 


 Sodium Chloride 0.9% [Normal Saline] 1,000 ml Med  05/12/18 01:58 Active





 IV .Bolus   


 


 cefTRIAXone [Rocephin in Dextrose,Iso-Osm 2 GM/50 ML] 2 Med  05/12/18 02:24 

Ordered





 gm   





 Premix Bag 1 bag   





 IV ONETIME   


 


 Blood Culture x2 Reflex Set [OM.PC] Stat Oth  05/12/18 00:11 Ordered








 Medication Orders





Sodium Chloride (Normal Saline)  1,000 mls @ 125 mls/hr IV .Bolus ONE


   Stop: 05/12/18 09:57


   Last Admin: 05/12/18 01:59  Dose: 125 mls/hr


Ceftriaxone Sodium/Dextrose 2 (gm/ Premix)  50 mls @ 100 mls/hr IV ONETIME ONE


   Stop: 05/12/18 02:53


Clindamycin Phosphate 900 mg/ (Premix)  50 mls @ 100 mls/hr IV ONETIME ONE


   Stop: 05/12/18 02:53








Labs: 


 Laboratory Tests











  05/12/18 05/12/18 05/12/18 Range/Units





  00:31 00:31 00:31 


 


WBC  0.67 L    (4.0-11.0)  K/uL


 


RBC  2.86 L    (4.50-5.90)  M/uL


 


Hgb  8.1 L    (13.0-17.0)  g/dL


 


Hct  23.7 L    (38.0-50.0)  %


 


MCV  82.9    (80.0-98.0)  fL


 


MCH  28.3    (27.0-32.0)  pg


 


MCHC  34.2    (31.0-37.0)  g/dL


 


RDW Std Deviation  39.1    (28.0-62.0)  fl


 


RDW Coeff of Rachele  13    (11.0-15.0)  %


 


Plt Count  67 L    (150-400)  K/uL


 


MPV  10.10    (7.40-12.00)  fL


 


Add Manual Diff  YES    


 


Neutrophils % (Manual)  55    (48.0-80.0)  %


 


Band Neutrophils %  1    %


 


Lymphocytes % (Manual)  37    (16.0-40.0)  %


 


Monocytes % (Manual)  7    (0.0-15.0)  %


 


Nucleated RBC %  0.0    /100WBC


 


Absolute Seg Neuts  0.4 L    (1.4-5.7)  


 


Band Neutrophils #  0    


 


Lymphocytes # (Manual)  0.2 L    (0.6-2.4)  


 


Monocytes # (Manual)  0.0    (0.0-0.8)  


 


Nucleated RBCs #  0    K/uL


 


INR   1.08   


 


Lactate    1.3  (0.20-2.00)  mmol/L


 


Sodium     (136-148)  mmol/L


 


Potassium     (3.5-5.1)  mmol/L


 


Chloride     ()  mmol/L


 


Carbon Dioxide     (21.0-32.0)  mmol/L


 


BUN     (7.0-18.0)  mg/dL


 


Creatinine     (0.8-1.3)  mg/dL


 


Est Cr Clr Drug Dosing     mL/min


 


Estimated GFR (MDRD)     ml/min


 


Glucose     ()  mg/dL


 


Calcium     (8.5-10.1)  mg/dL


 


Magnesium     (1.5-2.0)  mg/dL


 


Total Bilirubin     (0.2-1.0)  mg/dL


 


AST     (15-37)  IU/L


 


ALT     (14-63)  IU/L


 


Alkaline Phosphatase     ()  U/L


 


Total Protein     (6.4-8.2)  g/dL


 


Albumin     (3.4-5.0)  g/dL


 


Globulin     (2.0-3.5)  g/dL


 


Albumin/Globulin Ratio     (1.3-2.8)  


 


Amylase     ()  U/L


 


Lipase     ()  U/L


 


Urine Color     


 


Urine Appearance     


 


Urine pH     (5.0-8.0)  


 


Ur Specific Gravity     (1.001-1.035)  


 


Urine Protein     (NEGATIVE)  mg/dL


 


Urine Glucose (UA)     (NEGATIVE)  mg/dL


 


Urine Ketones     (NEGATIVE)  mg/dL


 


Urine Occult Blood     (NEGATIVE)  


 


Urine Nitrite     (NEGATIVE)  


 


Urine Bilirubin     (NEGATIVE)  


 


Urine Urobilinogen     (<2.0)  EU/dL


 


Ur Leukocyte Esterase     (NEGATIVE)  


 


Urine RBC     (0-2/HPF)  


 


Urine WBC     (0-5/HPF)  


 


Ur Epithelial Cells     (NONE-FEW)  


 


Ur Renal Epithelial Cell     


 


Urine Bacteria     (NEGATIVE)  


 


RBC Casts     (NEGATIVE)  


 


Blood Type     


 


Antibody Screen     














  05/12/18 05/12/18 05/12/18 Range/Units





  00:31 00:31 01:38 


 


WBC     (4.0-11.0)  K/uL


 


RBC     (4.50-5.90)  M/uL


 


Hgb     (13.0-17.0)  g/dL


 


Hct     (38.0-50.0)  %


 


MCV     (80.0-98.0)  fL


 


MCH     (27.0-32.0)  pg


 


MCHC     (31.0-37.0)  g/dL


 


RDW Std Deviation     (28.0-62.0)  fl


 


RDW Coeff of Rachele     (11.0-15.0)  %


 


Plt Count     (150-400)  K/uL


 


MPV     (7.40-12.00)  fL


 


Add Manual Diff     


 


Neutrophils % (Manual)     (48.0-80.0)  %


 


Band Neutrophils %     %


 


Lymphocytes % (Manual)     (16.0-40.0)  %


 


Monocytes % (Manual)     (0.0-15.0)  %


 


Nucleated RBC %     /100WBC


 


Absolute Seg Neuts     (1.4-5.7)  


 


Band Neutrophils #     


 


Lymphocytes # (Manual)     (0.6-2.4)  


 


Monocytes # (Manual)     (0.0-0.8)  


 


Nucleated RBCs #     K/uL


 


INR     


 


Lactate     (0.20-2.00)  mmol/L


 


Sodium  138    (136-148)  mmol/L


 


Potassium  3.6    (3.5-5.1)  mmol/L


 


Chloride  102    ()  mmol/L


 


Carbon Dioxide  31.8    (21.0-32.0)  mmol/L


 


BUN  16    (7.0-18.0)  mg/dL


 


Creatinine  1.1    (0.8-1.3)  mg/dL


 


Est Cr Clr Drug Dosing  55.35    mL/min


 


Estimated GFR (MDRD)  > 60.0    ml/min


 


Glucose  131 H    ()  mg/dL


 


Calcium  7.4 L    (8.5-10.1)  mg/dL


 


Magnesium  1.4 L    (1.5-2.0)  mg/dL


 


Total Bilirubin  1.0    (0.2-1.0)  mg/dL


 


AST  19    (15-37)  IU/L


 


ALT  26    (14-63)  IU/L


 


Alkaline Phosphatase  85    ()  U/L


 


Total Protein  5.7 L    (6.4-8.2)  g/dL


 


Albumin  2.6 L    (3.4-5.0)  g/dL


 


Globulin  3.1    (2.0-3.5)  g/dL


 


Albumin/Globulin Ratio  0.8 L    (1.3-2.8)  


 


Amylase  55    ()  U/L


 


Lipase  65 L    ()  U/L


 


Urine Color    YELLOW  


 


Urine Appearance    SLT CLOUDY  


 


Urine pH    7.5  (5.0-8.0)  


 


Ur Specific Gravity    1.015  (1.001-1.035)  


 


Urine Protein    TRACE  (NEGATIVE)  mg/dL


 


Urine Glucose (UA)    NEGATIVE  (NEGATIVE)  mg/dL


 


Urine Ketones    NEGATIVE  (NEGATIVE)  mg/dL


 


Urine Occult Blood    NEGATIVE  (NEGATIVE)  


 


Urine Nitrite    POSITIVE H  (NEGATIVE)  


 


Urine Bilirubin    NEGATIVE  (NEGATIVE)  


 


Urine Urobilinogen    2.0 H  (<2.0)  EU/dL


 


Ur Leukocyte Esterase    SMALL  (NEGATIVE)  


 


Urine RBC    0-3  (0-2/HPF)  


 


Urine WBC    10-15  (0-5/HPF)  


 


Ur Epithelial Cells    RARE  (NONE-FEW)  


 


Ur Renal Epithelial Cell    FEW  


 


Urine Bacteria    4+ H  (NEGATIVE)  


 


RBC Casts    0-1  (NEGATIVE)  


 


Blood Type   O POSITIVE   


 


Antibody Screen   NEGATIVE   











Meds: 


Medications











Generic Name Dose Route Start Last Admin





  Trade Name Freq  PRN Reason Stop Dose Admin


 


Sodium Chloride  1,000 mls @ 125 mls/hr  05/12/18 01:58  05/12/18 01:59





  Normal Saline  IV  05/12/18 09:57  125 mls/hr





  .Bolus ONE   Administration





     





     





     





     


 


Ceftriaxone Sodium/Dextrose 2  50 mls @ 100 mls/hr  05/12/18 02:24  





  gm/ Premix  IV  05/12/18 02:53  





  ONETIME ONE   





     





     





     





     


 


Clindamycin Phosphate 900 mg/  50 mls @ 100 mls/hr  05/12/18 02:24  





  Premix  IV  05/12/18 02:53  





  ONETIME ONE   





     





     





     





     














Discontinued Medications














Generic Name Dose Route Start Last Admin





  Trade Name Freq  PRN Reason Stop Dose Admin


 


Al Hydroxide/Mg Hydroxide 15  0 ml  05/12/18 00:18  05/12/18 00:51





ml/ Metoclopramide HCl 5 mg/  PO  05/12/18 00:19  1 each





Lidocaine HCl 5 ml  ONETIME ONE   Administration





     





     





     





     


 


Dexamethasone  10 mg  05/12/18 02:24  





  Dexamethasone  IVPUSH  05/12/18 02:25  





  ONETIME ONE   





     





     





     





     


 


Sodium Chloride  1,000 mls @ 999 mls/hr  05/12/18 00:11  05/12/18 00:49





  Normal Saline  IV  05/12/18 01:11  999 mls/hr





  STAT ONE   Administration





     





     





     





     


 


Ondansetron HCl  4 mg  05/12/18 00:11  05/12/18 00:51





  Zofran  IVPUSH  05/12/18 00:12  4 mg





  ONETIME ONE   Administration





     





     





     





     


 


Pantoprazole Sodium  80 mg  05/12/18 00:11  05/12/18 00:51





  Protonix Iv***  IVPUSH  05/12/18 00:12  80 mg





  .BOLUS ONE   Administration





     





     





     





     














Departure





- Departure


Time of Disposition: 02:31


Disposition: DC/Tfer to [facility]


Condition: Good


Clinical Impression: 


 Pancytopenia, Neutropenic fever, Epiglottitis








- Discharge Information


Referrals: 


PCP,None [Primary Care Provider] - 





- My Orders


Last 24 Hours: 


My Active Orders





05/12/18 00:11


Chest 2V [CR] Stat 


Neck Soft Tissue [CR] Stat 


Blood Culture x2 Reflex Set [OM.PC] Stat 





05/12/18 00:31


CULTURE BLOOD [BC] Stat 





05/12/18 00:45


CULTURE BLOOD [BC] Stat 





05/12/18 01:35


Communication Order [RC] STAT 





05/12/18 01:38


UA W/MICROSCOPIC [URIN] Stat 





05/12/18 01:43


CULTURE URINE [RM] Stat 





05/12/18 01:58


Sodium Chloride 0.9% [Normal Saline] 1,000 ml IV .Bolus 





05/12/18 02:24


Clindamycin Phosphate in D5W [Cleocin in D5W] 900 mg   Premix Bag 1 bag IV 

ONETIME 


cefTRIAXone [Rocephin in Dextrose,Iso-Osm 2 GM/50 ML] 2 gm   Premix Bag 1 bag 

IV ONETIME 














- Assessment/Plan


Last 24 Hours: 


My Active Orders





05/12/18 00:11


Chest 2V [CR] Stat 


Neck Soft Tissue [CR] Stat 


Blood Culture x2 Reflex Set [OM.PC] Stat 





05/12/18 00:31


CULTURE BLOOD [BC] Stat 





05/12/18 00:45


CULTURE BLOOD [BC] Stat 





05/12/18 01:35


Communication Order [RC] STAT 





05/12/18 01:38


UA W/MICROSCOPIC [URIN] Stat 





05/12/18 01:43


CULTURE URINE [RM] Stat 





05/12/18 01:58


Sodium Chloride 0.9% [Normal Saline] 1,000 ml IV .Bolus 





05/12/18 02:24


Clindamycin Phosphate in D5W [Cleocin in D5W] 900 mg   Premix Bag 1 bag IV 

ONETIME 


cefTRIAXone [Rocephin in Dextrose,Iso-Osm 2 GM/50 ML] 2 gm   Premix Bag 1 bag 

IV ONETIME

## 2018-05-14 NOTE — CR
EXAM DATE: 18



PATIENT'S AGE: 77



Patient: ANJANA PLAZA



Facility: [place], [place]

Patient ID: [id number]

Site Patient ID: [id number].

Site Accession #: [id number]

: 1940

Study: XRay Chest [id number]/12/2018 1:44:56 AM

Ordering Physician: Shae Brown



Final Report: 

INDICATION: Shortness of Breath, Fever, Decreased Fluid Intake



TECHNIQUE:

Chest 2 views



COMPARISON:

2018 



FINDINGS:

Cardiovascular and mediastinum: Heart size and vasculature are normal in 
caliber and appearance. Mediastinum is within normal limits. 



Lungs and pleural spaces: Scarring throughout both lungs most pronounced in the 
left lower lobe. Hyperinflation. Eventration of the right hemidiaphragm. No 
sign of pleural effusion. No pneumothorax. 



Bones: Degenerative changes. 



Other: Right-sided Mediport catheter in place. 



IMPRESSION:

No acute cardiopulmonary disease



Dictated by Lorenzo Honeycutt MD @ 2018 1:53:26 AM





Dictated by: Lorenzo Honeycutt MD @ 2018 01:56:16

(Electronic Signature)





Report Signed by Proxy.
[facility]

## 2018-05-14 NOTE — CR
EXAM DATE: 18



PATIENT'S AGE: 77



Patient: ANJANA PLAZA



Facility: [place], [place]

Patient ID: [id number]

Site Patient ID: [id number].

Site Accession #: [id number]

: 1940

Study: XRay ST Neck [id number]/12/2018 1:43:18 AM

Ordering Physician: Shae Brown



Final Report: 

INDICATION: Throat Pain



TECHNIQUE:

Soft tissue neck 2 view.



COMPARISON:

None.



FINDINGS:

The airway is patent and normal. Epiglottis is thickened. The retropharyngeal 
soft tissues are normal. No obvious masses. The visualized cervical spine 
demonstrates degenerative changes.



IMPRESSION:

Findings are consistent with epiglottitis.



Dictated by: Lorenzo Honeycutt MD @ 2018 01:50:12

(Electronic Signature)



Report Signed by Proxy.
[facility]

## 2019-04-07 ENCOUNTER — HOSPITAL ENCOUNTER (INPATIENT)
Dept: HOSPITAL 56 - MW.ED | Age: 79
LOS: 4 days | Discharge: NURSING, REHAB OR LONG-TERM CARE | DRG: 153 | End: 2019-04-11
Attending: INTERNAL MEDICINE | Admitting: INTERNAL MEDICINE
Payer: MEDICARE
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
## 2019-04-07 DIAGNOSIS — G47.33: ICD-10-CM

## 2019-04-07 DIAGNOSIS — E03.9: ICD-10-CM
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
## 2019-04-07 DIAGNOSIS — K21.9: ICD-10-CM

## 2019-04-07 DIAGNOSIS — Z87.891: ICD-10-CM

## 2019-04-07 DIAGNOSIS — T22.052A: ICD-10-CM

## 2019-04-07 DIAGNOSIS — Z79.899: ICD-10-CM

## 2019-04-07 DIAGNOSIS — E66.9: ICD-10-CM

## 2019-04-07 DIAGNOSIS — R42: ICD-10-CM

## 2019-04-07 DIAGNOSIS — C67.9: ICD-10-CM

## 2019-04-07 DIAGNOSIS — N39.0: ICD-10-CM

## 2019-04-07 DIAGNOSIS — H40.9: ICD-10-CM

## 2019-04-07 DIAGNOSIS — I10: ICD-10-CM

## 2019-04-07 DIAGNOSIS — C79.51: ICD-10-CM

## 2019-04-07 DIAGNOSIS — M19.90: ICD-10-CM

## 2019-04-07 DIAGNOSIS — T70.0XXA: Primary | ICD-10-CM

## 2019-04-07 DIAGNOSIS — T31.0: ICD-10-CM

## 2019-04-07 DIAGNOSIS — Z79.890: ICD-10-CM

## 2019-04-07 DIAGNOSIS — R53.1: ICD-10-CM

## 2019-04-07 LAB
CHLORIDE SERPL-SCNC: 107 MMOL/L (ref 98–107)
SODIUM SERPL-SCNC: 144 MMOL/L (ref 136–148)

## 2019-04-07 PROCEDURE — 99285 EMERGENCY DEPT VISIT HI MDM: CPT

## 2019-04-07 PROCEDURE — 80048 BASIC METABOLIC PNL TOTAL CA: CPT

## 2019-04-07 PROCEDURE — 85610 PROTHROMBIN TIME: CPT

## 2019-04-07 PROCEDURE — 70450 CT HEAD/BRAIN W/O DYE: CPT

## 2019-04-07 PROCEDURE — 93005 ELECTROCARDIOGRAM TRACING: CPT

## 2019-04-07 PROCEDURE — 85025 COMPLETE CBC W/AUTO DIFF WBC: CPT

## 2019-04-07 PROCEDURE — 81001 URINALYSIS AUTO W/SCOPE: CPT

## 2019-04-07 PROCEDURE — 87086 URINE CULTURE/COLONY COUNT: CPT

## 2019-04-07 PROCEDURE — 84484 ASSAY OF TROPONIN QUANT: CPT

## 2019-04-07 PROCEDURE — 36415 COLL VENOUS BLD VENIPUNCTURE: CPT

## 2019-04-07 PROCEDURE — 71045 X-RAY EXAM CHEST 1 VIEW: CPT

## 2019-04-07 PROCEDURE — 80053 COMPREHEN METABOLIC PANEL: CPT

## 2019-04-07 PROCEDURE — 97161 PT EVAL LOW COMPLEX 20 MIN: CPT

## 2019-04-07 RX ADMIN — DEXTROSE SCH UNITS: 10 SOLUTION INTRAVENOUS at 21:24

## 2019-04-07 NOTE — EDM.PDOC
ED HPI GENERAL MEDICAL PROBLEM





- General


Chief Complaint: General


Stated Complaint: AMB


Time Seen by Provider: 04/07/19 14:08


Source of Information: Reports: Patient


History Limitations: Reports: No Limitations





- History of Present Illness


INITIAL COMMENTS - FREE TEXT/NARRATIVE: 


HISTORY AND PHYSICAL:





History of present illness:


Patient is a 78-year-old male who presents to the ED today for increase in 

falls and weakness over the past 7-10 days. Patient states that he states that 

prior to this, he was able to ambulate without difficulty. Patient does live 

alone and does not have anybody to help him home. Patient states that he fell 2 

times today and was not able to get off the floor. Patient states he does have 

a history of bladder cancer and is currently receiving immunotherapy treatment. 

Patient states that he has not been eating and drinking over the past couple 

days due to this generalized weakness and difficulties getting around the house 

by himself.





Patient denies fever, chills, chest pain, shortness of breath, or cough. Denies 

headache, neck stiff ness, change in vision, syncope, or near syncope. Denies 

nausea, vomiting, abdominal pain, diarrhea, constipation, or dysuria. Patient 

has a history of bladder cancer, hypertension, hypothyroid, DALI, and GERD.





Review of systems: 


As per history of present illness and below otherwise all systems reviewed and 

negative.





Past medical history: 


As per history of present illness and as reviewed below otherwise 

noncontributory.





Surgical history: 


As per history of present illness and as reviewed below otherwise 

noncontributory.





Social history: 


See social history for further information





Family history: 


As per history of present illness and as reviewed below otherwise 

noncontributory.





Physical exam:


General: Patient is alert, oriented, and in no acute distress. He is lying 

comfortably on exam table.


HEENT: Atraumatic, normocephalic, pupils equal and reactive bilaterally, 

negative for conjunctival pallor or scleral icterus, mucous membranes dry, TMs 

normal bilaterally, throat clear, neck supple, nontender, trachea midline. No 

drooling or trismus noted. No meningeal signs. No hot potato voice noted. 


Lungs: Clear to auscultation, breath sounds equal bilaterally, chest nontender.


Heart: Heart sounds are distant, so exam of heart is limited. S1S2, regular 

rate and rhythm without overt murmur


Abdomen: Obese, soft, nondistended, nontender. Negative for masses or 

hepatosplenomegaly. Negative for costovertebral tenderness.


Pelvis: Stable nontender.


Genitourinary: Deferred.


Rectal: Deferred.


Skin: Intact, warm, dry. No lesions or rashes noted.


Extremities: Atraumatic, negative for cords or calf pain. Neurovascular 

unremarkable.


Neuro: Awake, alert, oriented. Cranial nerves II through XII unremarkable. 

Cerebellum unremarkable. Motor and sensory unremarkable throughout. Exam 

nonfocal.





Notes:


Will do labs and imaging today.


While with nursing staff, patient is unable to ambulate by himself. He also has 

difficulty sitting up in the bed without assistance. Labs do show patient has a 

urinary tract infection. Patient is unable to perform ADL's at this time.


Dr. Guadarrama was consulted on this patient and will admit to observation.





Diagnostics:


CBC, CMP, UA with culture, PT INR, troponin, chest x-ray, head CT, cardiac 

monitoring, EKG, orthostatic vitals





Therapeutics:


Saline





Impression: 


Urinary tract infection


Generalized weakness


Fall risk





Plan:


1. Admit to observation to Dr. Guadarrama.





Definitive disposition and diagnosis as appropriate pending reevaluation and 

review of above.








- Related Data


 Allergies











Allergy/AdvReac Type Severity Reaction Status Date / Time


 


No Known Allergies Allergy   Verified 04/07/19 13:59











Home Meds: 


 Home Meds





Triamterene/Hydrochlorothiazid [Triamterene-HCTZ 37.5-25 MG] 0.5 tab PO BID 06/

04/15 [History]


Fish Oil/Omega-3 Fatty Acids [Fish Oil 1,000 MG] 1 tab PO BID 05/18/17 [History]


Latanoprost [Xalatan 0.005% Ophth Soln] 1 drop EYEBOTH BEDTIME 05/18/17 [History

]


traZODone HCl [Trazodone HCl] 25 mg PO BEDTIME PRN 05/18/17 [History]


Levothyroxine 25 mcg PO ACBREAKFAST 12/08/18 [History]


oxyCODONE 5 mg PO Q8H PRN 5 Days #15 tab 12/09/18 [Rx]











Past Medical History





- Past Health History


Medical/Surgical History: Denies Medical/Surgical History


HEENT History: Reports: Cataract, Glaucoma


Other HEENT History: wears glasses


Cardiovascular History: Reports: Hypertension


Respiratory History: Reports: Sleep Apnea


Other Respiratory History: does not use CPAP


Gastrointestinal History: Reports: GERD


Genitourinary History: Reports: Other (See Below)


Other Genitourinary History: hx of bladder tumor


Musculoskeletal History: Reports: Arthritis, Fracture


Other Musculoskeletal History: hx of fx thumb, left shoulder pain


Neurological History: Reports: None


Psychiatric History: Reports: None


Endocrine/Metabolic History: Reports: Obesity/BMI 30+


Hematologic History: Reports: None


Immunologic History: Reports: None


Oncologic (Cancer) History: Reports: Bladder


Dermatologic History: Reports: None





- Infectious Disease History


Infectious Disease History: Reports: Chicken Pox, Measles





- Past Surgical History


Head Surgeries/Procedures: Reports: None


HEENT Surgical History: Reports: Cataract Surgery, Other (See Below)


Other HEENT Surgeries/Procedures: hx of Blepharoplasty


Cardiovascular Surgical History: Reports: None


Respiratory Surgical History: Reports: None


GI Surgical History: Reports: Appendectomy


Male  Surgical History: Reports: TURBT-Transurethral Resection of Bladder 

Tumor


Endocrine Surgical History: Reports: None


Neurological Surgical History: Reports: None


Musculoskeletal Surgical History: Reports: None


Oncologic Surgical History: Reports: None


Dermatological Surgical History: Reports: None





Social & Family History





- Family History


Family Medical History: Noncontributory


HEENT: Reports: None





- Tobacco Use


Smoking Status *Q: Former Smoker


Used Tobacco, but Quit: Yes


Month/Year Tobacco Last Used: 1959





- Caffeine Use


Caffeine Use: Reports: Coffee





- Recreational Drug Use


Recreational Drug Use: No





ED ROS GENERAL





- Review of Systems


Review Of Systems: ROS reveals no pertinent complaints other than HPI.





ED EXAM, GENERAL





- Physical Exam


Exam: See Below (see dictation)





Course





- Vital Signs


Last Recorded V/S: 


 Last Vital Signs











Temp  36.2 C   04/07/19 13:59


 


Pulse  87   04/07/19 17:00


 


Resp  18   04/07/19 17:00


 


BP  172/87 H  04/07/19 17:00


 


Pulse Ox  96   04/07/19 17:00








 





Orthostatic Blood Pressure [     149/87


Standing]                        


Orthostatic Blood Pressure [     178/92


Sitting]                         


Orthostatic Blood Pressure [     161/109


Supine]                          











- Orders/Labs/Meds


Orders: 


 Active Orders 24 hr











 Category Date Time Status


 


 Admission Status [Patient Status] [ADT] Stat ADT  04/07/19 17:18 Active


 


 Cardiac Monitoring [RC] .AS DIRECTED Care  04/07/19 14:21 Active


 


 EKG Documentation Completion [RC] STAT Care  04/07/19 14:21 Active


 


 Orthostatic Vital Signs [RC] ASDIRECTED Care  04/07/19 14:24 Active


 


 CULTURE URINE [RM] Stat Lab  04/07/19 16:12 Received











Labs: 


 Laboratory Tests











  04/07/19 04/07/19 04/07/19 Range/Units





  14:49 14:49 14:49 


 


WBC  7.53    (4.0-11.0)  K/uL


 


RBC  4.81    (4.50-5.90)  M/uL


 


Hgb  13.1    (13.0-17.0)  g/dL


 


Hct  39.4    (38.0-50.0)  %


 


MCV  81.9    (80.0-98.0)  fL


 


MCH  27.2    (27.0-32.0)  pg


 


MCHC  33.2    (31.0-37.0)  g/dL


 


RDW Std Deviation  43.2    (28.0-62.0)  fl


 


RDW Coeff of Rachele  15    (11.0-15.0)  %


 


Plt Count  185    (150-400)  K/uL


 


MPV  8.90    (7.40-12.00)  fL


 


Neut % (Auto)  80.9 H    (48.0-80.0)  %


 


Lymph % (Auto)  12.6 L    (16.0-40.0)  %


 


Mono % (Auto)  5.3    (0.0-15.0)  %


 


Eos % (Auto)  0.9    (0.0-7.0)  %


 


Baso % (Auto)  0.3    (0.0-1.5)  %


 


Neut # (Auto)  6.1 H    (1.4-5.7)  K/uL


 


Lymph # (Auto)  1.0    (0.6-2.4)  K/uL


 


Mono # (Auto)  0.4    (0.0-0.8)  K/uL


 


Eos # (Auto)  0.1    (0.0-0.7)  K/uL


 


Baso # (Auto)  0.0    (0.0-0.1)  K/uL


 


Nucleated RBC %  0.0    /100WBC


 


Nucleated RBCs #  0    K/uL


 


INR   1.09   


 


Sodium    144  (136-148)  mmol/L


 


Potassium    3.7  (3.5-5.1)  mmol/L


 


Chloride    107  ()  mmol/L


 


Carbon Dioxide    27.9  (21.0-32.0)  mmol/L


 


BUN    17  (7.0-18.0)  mg/dL


 


Creatinine    0.9  (0.8-1.3)  mg/dL


 


Est Cr Clr Drug Dosing    68.72  mL/min


 


Estimated GFR (MDRD)    > 60.0  ml/min


 


Glucose    100  ()  mg/dL


 


Calcium    9.1  (8.5-10.1)  mg/dL


 


Total Bilirubin    1.0  (0.2-1.0)  mg/dL


 


AST    30  (15-37)  IU/L


 


ALT    21  (14-63)  IU/L


 


Alkaline Phosphatase    135 H  ()  U/L


 


Troponin I    < 0.050  (0.000-0.056)  ng/mL


 


Total Protein    7.3  (6.4-8.2)  g/dL


 


Albumin    3.6  (3.4-5.0)  g/dL


 


Globulin    3.7  (2.6-4.0)  g/dL


 


Albumin/Globulin Ratio    1.0  (0.9-1.6)  


 


Urine Color     


 


Urine Appearance     


 


Urine pH     (5.0-8.0)  


 


Ur Specific Gravity     (1.001-1.035)  


 


Urine Protein     (NEGATIVE)  mg/dL


 


Urine Glucose (UA)     (NEGATIVE)  mg/dL


 


Urine Ketones     (NEGATIVE)  mg/dL


 


Urine Occult Blood     (NEGATIVE)  


 


Urine Nitrite     (NEGATIVE)  


 


Urine Bilirubin     (NEGATIVE)  


 


Urine Ictotest     


 


Urine Urobilinogen     (<2.0)  EU/dL


 


Ur Leukocyte Esterase     (NEGATIVE)  


 


Urine RBC     (0-2/HPF)  


 


Urine WBC     (0-5/HPF)  


 


Ur Epithelial Cells     (NONE-FEW)  


 


Urine Bacteria     (NEGATIVE)  














  04/07/19 Range/Units





  16:12 


 


WBC   (4.0-11.0)  K/uL


 


RBC   (4.50-5.90)  M/uL


 


Hgb   (13.0-17.0)  g/dL


 


Hct   (38.0-50.0)  %


 


MCV   (80.0-98.0)  fL


 


MCH   (27.0-32.0)  pg


 


MCHC   (31.0-37.0)  g/dL


 


RDW Std Deviation   (28.0-62.0)  fl


 


RDW Coeff of Rachele   (11.0-15.0)  %


 


Plt Count   (150-400)  K/uL


 


MPV   (7.40-12.00)  fL


 


Neut % (Auto)   (48.0-80.0)  %


 


Lymph % (Auto)   (16.0-40.0)  %


 


Mono % (Auto)   (0.0-15.0)  %


 


Eos % (Auto)   (0.0-7.0)  %


 


Baso % (Auto)   (0.0-1.5)  %


 


Neut # (Auto)   (1.4-5.7)  K/uL


 


Lymph # (Auto)   (0.6-2.4)  K/uL


 


Mono # (Auto)   (0.0-0.8)  K/uL


 


Eos # (Auto)   (0.0-0.7)  K/uL


 


Baso # (Auto)   (0.0-0.1)  K/uL


 


Nucleated RBC %   /100WBC


 


Nucleated RBCs #   K/uL


 


INR   


 


Sodium   (136-148)  mmol/L


 


Potassium   (3.5-5.1)  mmol/L


 


Chloride   ()  mmol/L


 


Carbon Dioxide   (21.0-32.0)  mmol/L


 


BUN   (7.0-18.0)  mg/dL


 


Creatinine   (0.8-1.3)  mg/dL


 


Est Cr Clr Drug Dosing   mL/min


 


Estimated GFR (MDRD)   ml/min


 


Glucose   ()  mg/dL


 


Calcium   (8.5-10.1)  mg/dL


 


Total Bilirubin   (0.2-1.0)  mg/dL


 


AST   (15-37)  IU/L


 


ALT   (14-63)  IU/L


 


Alkaline Phosphatase   ()  U/L


 


Troponin I   (0.000-0.056)  ng/mL


 


Total Protein   (6.4-8.2)  g/dL


 


Albumin   (3.4-5.0)  g/dL


 


Globulin   (2.6-4.0)  g/dL


 


Albumin/Globulin Ratio   (0.9-1.6)  


 


Urine Color  AMELIE  


 


Urine Appearance  SLT CLOUDY  


 


Urine pH  5.5  (5.0-8.0)  


 


Ur Specific Gravity  >= 1.030  (1.001-1.035)  


 


Urine Protein  100 H  (NEGATIVE)  mg/dL


 


Urine Glucose (UA)  NEGATIVE  (NEGATIVE)  mg/dL


 


Urine Ketones  15 H  (NEGATIVE)  mg/dL


 


Urine Occult Blood  LARGE H  (NEGATIVE)  


 


Urine Nitrite  POSITIVE H  (NEGATIVE)  


 


Urine Bilirubin  MODERATE H  (NEGATIVE)  


 


Urine Ictotest  NEGATIVE  


 


Urine Urobilinogen  1.0  (<2.0)  EU/dL


 


Ur Leukocyte Esterase  NEGATIVE  (NEGATIVE)  


 


Urine RBC  100-150  (0-2/HPF)  


 


Urine WBC  1-3  (0-5/HPF)  


 


Ur Epithelial Cells  FEW  (NONE-FEW)  


 


Urine Bacteria  1+ H  (NEGATIVE)  











Meds: 


Medications














Discontinued Medications














Generic Name Dose Route Start Last Admin





  Trade Name Freq  PRN Reason Stop Dose Admin


 


Sodium Chloride  1,000 mls @ 999 mls/hr  04/07/19 14:21  04/07/19 15:02





  Normal Saline  IV  04/07/19 15:21  999 mls/hr





  BOLUS ONE   Administration





     





     





     





     














Departure





- Departure


Time of Disposition: 17:24


Disposition: Refer to Observation


Clinical Impression: 


 Generalized weakness, At risk for falling





Urinary tract infection


Qualifiers:


 Urinary tract infection type: site unspecified Hematuria presence: with 

hematuria Qualified Code(s): N39.0 - Urinary tract infection, site not specified








- Discharge Information





- My Orders


Last 24 Hours: 


My Active Orders





04/07/19 14:21


Cardiac Monitoring [RC] .AS DIRECTED 


EKG Documentation Completion [RC] STAT 





04/07/19 14:24


Orthostatic Vital Signs [RC] ASDIRECTED 





04/07/19 16:12


CULTURE URINE [RM] Stat 





04/07/19 17:18


Admission Status [Patient Status] [ADT] Stat 














- Assessment/Plan


Last 24 Hours: 


My Active Orders





04/07/19 14:21


Cardiac Monitoring [RC] .AS DIRECTED 


EKG Documentation Completion [RC] STAT 





04/07/19 14:24


Orthostatic Vital Signs [RC] ASDIRECTED 





04/07/19 16:12


CULTURE URINE [RM] Stat 





04/07/19 17:18


Admission Status [Patient Status] [ADT] Stat

## 2019-04-07 NOTE — CR
Indication:



Confusion. Fall.



Technique:



A single AP portable view of the chest was obtained.



Comparison:



None



Findings:



The heart is normal in size. The lungs are clear. No infiltrate, pleural 

effusion, or pneumothorax is identified.



Impression:



No acute cardiopulmonary process.



Dictated by Lucille Alan MD @ Apr 7 2019  2:52PM



Signed by Dr. Lucille Alan @ Apr 7 2019  2:52PM

## 2019-04-07 NOTE — CT
INDICATION:



Fall, head injury.



TECHNIQUE:



CT head without contrast.



COMPARISON:



None. 



FINDINGS:



CSF spaces: Within normal limits for age.  



Brain parenchyma: The gray-white differentiation is normal.  No sign of 

mass, hemorrhage, or midline shift.  



Skull base and calvarium: The visualized paranasal sinuses and mastoid air 

cells demonstrate no acute or significant findings.  The visualized orbits 

are grossly unremarkable.  No skull fractures.  



IMPRESSION:



Unremarkable noncontrast head CT.



Please note that all CT scans at this facility use dose modulation, 

iterative reconstruction, and/or weight-based dosing when appropriate to 

reduce radiation dose to as low as reasonably achievable.



Dictated by Damir Arce MD @ Apr 7 2019  2:53PM



Signed by Dr. Damir Arce @ Apr 7 2019  2:57PM

## 2019-04-07 NOTE — PCM.HP
H&P History of Present Illness





- General


Date of Service: 04/07/19


Admit Problem/Dx: 


 Admission Diagnosis/Problem





Admission Diagnosis/Problem      Weakness











- History of Present Illness


Initial Comments - Free Text/Narative: 





79 yo male with pmh of metastatic bladder cancer who is on immunotherapy who 

presents with one week history of generalized weakness.  He reports multiple 

falls at home as his knees give out. He reports that he is no longer strong 

enough to walk. He denies any numbness, bladder or stool incontinence. He 

reports three weeks of vertigo since a flight back from [place] when his ears 

did not pop.  He has an appointment with an ear specialist on Tuesday.  He 

denies any fevers or dysuria.  





- Related Data


Allergies/Adverse Reactions: 


 Allergies











Allergy/AdvReac Type Severity Reaction Status Date / Time


 


No Known Allergies Allergy   Verified 04/07/19 13:59











Home Medications: 


 Home Meds





Triamterene/Hydrochlorothiazid [Triamterene-HCTZ 37.5-25 MG] 0.5 tab PO BID 06/

04/15 [History]


Fish Oil/Omega-3 Fatty Acids [Fish Oil 1,000 MG] 1 tab PO BID 05/18/17 [History]


Latanoprost [Xalatan 0.005% Ophth Soln] 1 drop EYEBOTH BEDTIME 05/18/17 [History

]


traZODone HCl [Trazodone HCl] 25 mg PO BEDTIME PRN 05/18/17 [History]


Levothyroxine 25 mcg PO ACBREAKFAST 12/08/18 [History]


oxyCODONE 5 mg PO Q8H PRN 5 Days #15 tab 12/09/18 [Rx]











Past Medical History





- Past Health History


Medical/Surgical History: Denies Medical/Surgical History


HEENT History: Reports: Cataract, Glaucoma


Other HEENT History: wears glasses


Cardiovascular History: Reports: Hypertension


Respiratory History: Reports: Sleep Apnea


Other Respiratory History: does not use CPAP


Gastrointestinal History: Reports: GERD


Genitourinary History: Reports: Other (See Below)


Other Genitourinary History: hx of bladder tumor


Musculoskeletal History: Reports: Arthritis, Fracture


Other Musculoskeletal History: hx of fx thumb, left shoulder pain


Neurological History: Reports: None


Psychiatric History: Reports: None


Endocrine/Metabolic History: Reports: Obesity/BMI 30+


Hematologic History: Reports: None


Immunologic History: Reports: None


Oncologic (Cancer) History: Reports: Bladder


Dermatologic History: Reports: None





- Infectious Disease History


Infectious Disease History: Reports: Chicken Pox, Measles





- Past Surgical History


Head Surgeries/Procedures: Reports: None


HEENT Surgical History: Reports: Cataract Surgery, Other (See Below)


Other HEENT Surgeries/Procedures: hx of Blepharoplasty


Cardiovascular Surgical History: Reports: None


Respiratory Surgical History: Reports: None


GI Surgical History: Reports: Appendectomy


Male  Surgical History: Reports: TURBT-Transurethral Resection of Bladder 

Tumor


Endocrine Surgical History: Reports: None


Neurological Surgical History: Reports: None


Musculoskeletal Surgical History: Reports: None


Oncologic Surgical History: Reports: None


Dermatological Surgical History: Reports: None





Social & Family History





- Family History


Family Medical History: Noncontributory


HEENT: Reports: None





- Tobacco Use


Smoking Status *Q: Former Smoker


Used Tobacco, but Quit: Yes


Month/Year Tobacco Last Used: 1959





- Caffeine Use


Caffeine Use: Reports: Coffee





- Recreational Drug Use


Recreational Drug Use: No





H&P Review of Systems





- Review of Systems:


Review Of Systems: ROS reveals no pertinent complaints other than HPI.





Exam





- Exam


Exam: See Below





- Vital Signs


Vital Signs: 


 Last Vital Signs











Temp  36.2 C   04/07/19 13:59


 


Pulse  87   04/07/19 17:00


 


Resp  18   04/07/19 17:00


 


BP  172/87 H  04/07/19 17:00


 


Pulse Ox  96   04/07/19 17:00








 





Orthostatic Blood Pressure [     149/87


Standing]                        


Orthostatic Blood Pressure [     178/92


Sitting]                         


Orthostatic Blood Pressure [     161/109


Supine]                          








Weight: 108.862 kg





- Exam


General: Alert, Oriented


HEENT: Mucosa Moist & Pink


Neck: Supple


Lungs: Clear to Auscultation, Normal Respiratory Effort


Cardiovascular: Regular Rate, Regular Rhythm


GI/Abdominal Exam: Soft, Non-Tender, No Abnormal Bruit


Extremities: Non-Tender, No Pedal Edema


Skin: Warm, Dry, Intact





- Patient Data


Lab Results Last 24 hrs: 


 Laboratory Results - last 24 hr











  04/07/19 04/07/19 04/07/19 Range/Units





  14:49 14:49 14:49 


 


WBC  7.53    (4.0-11.0)  K/uL


 


RBC  4.81    (4.50-5.90)  M/uL


 


Hgb  13.1    (13.0-17.0)  g/dL


 


Hct  39.4    (38.0-50.0)  %


 


MCV  81.9    (80.0-98.0)  fL


 


MCH  27.2    (27.0-32.0)  pg


 


MCHC  33.2    (31.0-37.0)  g/dL


 


RDW Std Deviation  43.2    (28.0-62.0)  fl


 


RDW Coeff of Rachele  15    (11.0-15.0)  %


 


Plt Count  185    (150-400)  K/uL


 


MPV  8.90    (7.40-12.00)  fL


 


Neut % (Auto)  80.9 H    (48.0-80.0)  %


 


Lymph % (Auto)  12.6 L    (16.0-40.0)  %


 


Mono % (Auto)  5.3    (0.0-15.0)  %


 


Eos % (Auto)  0.9    (0.0-7.0)  %


 


Baso % (Auto)  0.3    (0.0-1.5)  %


 


Neut # (Auto)  6.1 H    (1.4-5.7)  K/uL


 


Lymph # (Auto)  1.0    (0.6-2.4)  K/uL


 


Mono # (Auto)  0.4    (0.0-0.8)  K/uL


 


Eos # (Auto)  0.1    (0.0-0.7)  K/uL


 


Baso # (Auto)  0.0    (0.0-0.1)  K/uL


 


Nucleated RBC %  0.0    /100WBC


 


Nucleated RBCs #  0    K/uL


 


INR   1.09   


 


Sodium    144  (136-148)  mmol/L


 


Potassium    3.7  (3.5-5.1)  mmol/L


 


Chloride    107  ()  mmol/L


 


Carbon Dioxide    27.9  (21.0-32.0)  mmol/L


 


BUN    17  (7.0-18.0)  mg/dL


 


Creatinine    0.9  (0.8-1.3)  mg/dL


 


Est Cr Clr Drug Dosing    68.72  mL/min


 


Estimated GFR (MDRD)    > 60.0  ml/min


 


Glucose    100  ()  mg/dL


 


Calcium    9.1  (8.5-10.1)  mg/dL


 


Total Bilirubin    1.0  (0.2-1.0)  mg/dL


 


AST    30  (15-37)  IU/L


 


ALT    21  (14-63)  IU/L


 


Alkaline Phosphatase    135 H  ()  U/L


 


Troponin I    < 0.050  (0.000-0.056)  ng/mL


 


Total Protein    7.3  (6.4-8.2)  g/dL


 


Albumin    3.6  (3.4-5.0)  g/dL


 


Globulin    3.7  (2.6-4.0)  g/dL


 


Albumin/Globulin Ratio    1.0  (0.9-1.6)  


 


Urine Color     


 


Urine Appearance     


 


Urine pH     (5.0-8.0)  


 


Ur Specific Gravity     (1.001-1.035)  


 


Urine Protein     (NEGATIVE)  mg/dL


 


Urine Glucose (UA)     (NEGATIVE)  mg/dL


 


Urine Ketones     (NEGATIVE)  mg/dL


 


Urine Occult Blood     (NEGATIVE)  


 


Urine Nitrite     (NEGATIVE)  


 


Urine Bilirubin     (NEGATIVE)  


 


Urine Ictotest     


 


Urine Urobilinogen     (<2.0)  EU/dL


 


Ur Leukocyte Esterase     (NEGATIVE)  


 


Urine RBC     (0-2/HPF)  


 


Urine WBC     (0-5/HPF)  


 


Ur Epithelial Cells     (NONE-FEW)  


 


Urine Bacteria     (NEGATIVE)  














  04/07/19 Range/Units





  16:12 


 


WBC   (4.0-11.0)  K/uL


 


RBC   (4.50-5.90)  M/uL


 


Hgb   (13.0-17.0)  g/dL


 


Hct   (38.0-50.0)  %


 


MCV   (80.0-98.0)  fL


 


MCH   (27.0-32.0)  pg


 


MCHC   (31.0-37.0)  g/dL


 


RDW Std Deviation   (28.0-62.0)  fl


 


RDW Coeff of Rachele   (11.0-15.0)  %


 


Plt Count   (150-400)  K/uL


 


MPV   (7.40-12.00)  fL


 


Neut % (Auto)   (48.0-80.0)  %


 


Lymph % (Auto)   (16.0-40.0)  %


 


Mono % (Auto)   (0.0-15.0)  %


 


Eos % (Auto)   (0.0-7.0)  %


 


Baso % (Auto)   (0.0-1.5)  %


 


Neut # (Auto)   (1.4-5.7)  K/uL


 


Lymph # (Auto)   (0.6-2.4)  K/uL


 


Mono # (Auto)   (0.0-0.8)  K/uL


 


Eos # (Auto)   (0.0-0.7)  K/uL


 


Baso # (Auto)   (0.0-0.1)  K/uL


 


Nucleated RBC %   /100WBC


 


Nucleated RBCs #   K/uL


 


INR   


 


Sodium   (136-148)  mmol/L


 


Potassium   (3.5-5.1)  mmol/L


 


Chloride   ()  mmol/L


 


Carbon Dioxide   (21.0-32.0)  mmol/L


 


BUN   (7.0-18.0)  mg/dL


 


Creatinine   (0.8-1.3)  mg/dL


 


Est Cr Clr Drug Dosing   mL/min


 


Estimated GFR (MDRD)   ml/min


 


Glucose   ()  mg/dL


 


Calcium   (8.5-10.1)  mg/dL


 


Total Bilirubin   (0.2-1.0)  mg/dL


 


AST   (15-37)  IU/L


 


ALT   (14-63)  IU/L


 


Alkaline Phosphatase   ()  U/L


 


Troponin I   (0.000-0.056)  ng/mL


 


Total Protein   (6.4-8.2)  g/dL


 


Albumin   (3.4-5.0)  g/dL


 


Globulin   (2.6-4.0)  g/dL


 


Albumin/Globulin Ratio   (0.9-1.6)  


 


Urine Color  AMELIE  


 


Urine Appearance  SLT CLOUDY  


 


Urine pH  5.5  (5.0-8.0)  


 


Ur Specific Gravity  >= 1.030  (1.001-1.035)  


 


Urine Protein  100 H  (NEGATIVE)  mg/dL


 


Urine Glucose (UA)  NEGATIVE  (NEGATIVE)  mg/dL


 


Urine Ketones  15 H  (NEGATIVE)  mg/dL


 


Urine Occult Blood  LARGE H  (NEGATIVE)  


 


Urine Nitrite  POSITIVE H  (NEGATIVE)  


 


Urine Bilirubin  MODERATE H  (NEGATIVE)  


 


Urine Ictotest  NEGATIVE  


 


Urine Urobilinogen  1.0  (<2.0)  EU/dL


 


Ur Leukocyte Esterase  NEGATIVE  (NEGATIVE)  


 


Urine RBC  100-150  (0-2/HPF)  


 


Urine WBC  1-3  (0-5/HPF)  


 


Ur Epithelial Cells  FEW  (NONE-FEW)  


 


Urine Bacteria  1+ H  (NEGATIVE)  











Result Diagrams: 


 04/08/19 05:05





 04/08/19 05:05


Problem List Initiated/Reviewed/Updated: Yes


Orders Last 24hrs: 


 Active Orders 24 hr











 Category Date Time Status


 


 Admission Status [Patient Status] [ADT] Stat ADT  04/07/19 17:18 Active


 


 Antiembolic Devices [RC] PER UNIT ROUTINE Care  04/07/19 20:11 Ordered


 


 Cardiac Monitoring [RC] .AS DIRECTED Care  04/07/19 14:21 Active


 


 EKG Documentation Completion [RC] STAT Care  04/07/19 14:21 Active


 


 Orthostatic Vital Signs [RC] ASDIRECTED Care  04/07/19 14:24 Active


 


 Oxygen Therapy [RC] PRN Care  04/07/19 20:11 Ordered


 


 Up ad Teresa [RC] ASDIRECTED Care  04/07/19 20:11 Ordered


 


 VTE/DVT Education [RC] PER UNIT ROUTINE Care  04/07/19 20:11 Ordered


 


 Vital Signs [RC] Q4H Care  04/07/19 20:11 Ordered


 


 PT Evaluation and Treatment [CONS] Routine Cons  04/07/19 20:11 Ordered


 


 Regular Diet [DIET] Diet  04/07/19 Breakfast Ordered


 


 BASIC METABOLIC PANEL,BMP [CHEM] AM Lab  04/08/19 05:11 Ordered


 


 CBC WITH AUTO DIFF [HEME] AM Lab  04/08/19 05:11 Ordered


 


 CULTURE URINE [RM] Stat Lab  04/07/19 16:12 Received


 


 Heparin Sodium Med  04/07/19 20:15 Ordered





 5,000 units SUBCUT Q8H   


 


 Sodium Chloride 0.9% [Normal Saline] 1,000 ml Med  04/07/19 18:00 Active





 IV ASDIRECTED   


 


 cefTRIAXone [Rocephin] 1 gm Med  04/07/19 20:15 Ordered





 Sodium Chloride 0.9% [Normal Saline] 50 ml   





 IV Q24H   


 


 Sequential Compression Device [OM.PC] Per Unit Routine Oth  04/07/19 20:11 

Ordered


 


 Resuscitation Status Routine Resus Stat  04/07/19 20:11 Ordered








 Medication Orders





Heparin Sodium (Porcine) (Heparin Sodium)  5,000 units SUBCUT Q8H [facility]


Sodium Chloride (Normal Saline)  1,000 mls @ 999 mls/hr IV ASDIRECTED [facility]


   Last Admin: 04/07/19 17:15  Dose: 999 mls/hr


Ceftriaxone Sodium 1 gm/ (Sodium Chloride)  50 mls @ 100 mls/hr IV Q24H [facility]








Assessment/Plan Comment:: 





79 yo male admitted for generalized weakness.  He may have a UTI based on UA.  

We will treat with Rocephin.  PT has been consulted regarding vertigo.

## 2019-04-08 LAB
CHLORIDE SERPL-SCNC: 109 MMOL/L (ref 98–107)
SODIUM SERPL-SCNC: 144 MMOL/L (ref 136–148)

## 2019-04-08 RX ADMIN — OMEGA-3 FATTY ACIDS CAP 1000 MG SCH GM: 1000 CAP at 00:04

## 2019-04-08 RX ADMIN — OMEGA-3 FATTY ACIDS CAP 1000 MG SCH GM: 1000 CAP at 20:23

## 2019-04-08 RX ADMIN — DEXTROSE SCH UNITS: 10 SOLUTION INTRAVENOUS at 04:30

## 2019-04-08 RX ADMIN — TRIAMTERENE AND HYDROCHLOROTHIAZIDE SCH EACH: 37.5; 25 TABLET ORAL at 08:34

## 2019-04-08 RX ADMIN — OMEGA-3 FATTY ACIDS CAP 1000 MG SCH GM: 1000 CAP at 08:34

## 2019-04-08 RX ADMIN — DEXTROSE SCH UNITS: 10 SOLUTION INTRAVENOUS at 20:23

## 2019-04-08 RX ADMIN — TRIAMTERENE AND HYDROCHLOROTHIAZIDE SCH EACH: 37.5; 25 TABLET ORAL at 13:28

## 2019-04-08 RX ADMIN — FLUTICASONE PROPIONATE SCH SPRAY: 50 SPRAY, METERED NASAL at 13:28

## 2019-04-08 RX ADMIN — DEXTROSE SCH UNITS: 10 SOLUTION INTRAVENOUS at 13:24

## 2019-04-08 NOTE — PCM.PN
- General Info


Date of Service: 04/08/19





- Review of Systems


Systems Review Comment:: 





patient reports feeling stronger, still requiring two person assist with 

mobility.





- Patient Data


Vitals - Most Recent: 


 Last Vital Signs











Temp  36.9 C   04/08/19 08:08


 


Pulse  74   04/08/19 08:08


 


Resp  14   04/08/19 08:08


 


BP  142/78 H  04/08/19 08:08


 


Pulse Ox  95   04/08/19 08:08








 





Orthostatic Blood Pressure [     149/87


Standing]                        


Orthostatic Blood Pressure [     178/92


Sitting]                         


Orthostatic Blood Pressure [     161/109


Supine]                          








Weight - Most Recent: 108.862 kg


I&O - Last 24 Hours: 


 Intake & Output











 04/07/19 04/08/19 04/08/19





 22:59 06:59 14:59


 


Intake Total  300 


 


Output Total  375 


 


Balance  -75 











Lab Results Last 24 Hours: 


 Laboratory Results - last 24 hr











  04/07/19 04/07/19 04/07/19 Range/Units





  14:49 14:49 14:49 


 


WBC  7.53    (4.0-11.0)  K/uL


 


RBC  4.81    (4.50-5.90)  M/uL


 


Hgb  13.1    (13.0-17.0)  g/dL


 


Hct  39.4    (38.0-50.0)  %


 


MCV  81.9    (80.0-98.0)  fL


 


MCH  27.2    (27.0-32.0)  pg


 


MCHC  33.2    (31.0-37.0)  g/dL


 


RDW Std Deviation  43.2    (28.0-62.0)  fl


 


RDW Coeff of Rachele  15    (11.0-15.0)  %


 


Plt Count  185    (150-400)  K/uL


 


MPV  8.90    (7.40-12.00)  fL


 


Neut % (Auto)  80.9 H    (48.0-80.0)  %


 


Lymph % (Auto)  12.6 L    (16.0-40.0)  %


 


Mono % (Auto)  5.3    (0.0-15.0)  %


 


Eos % (Auto)  0.9    (0.0-7.0)  %


 


Baso % (Auto)  0.3    (0.0-1.5)  %


 


Neut # (Auto)  6.1 H    (1.4-5.7)  K/uL


 


Lymph # (Auto)  1.0    (0.6-2.4)  K/uL


 


Mono # (Auto)  0.4    (0.0-0.8)  K/uL


 


Eos # (Auto)  0.1    (0.0-0.7)  K/uL


 


Baso # (Auto)  0.0    (0.0-0.1)  K/uL


 


Nucleated RBC %  0.0    /100WBC


 


Nucleated RBCs #  0    K/uL


 


INR   1.09   


 


Sodium    144  (136-148)  mmol/L


 


Potassium    3.7  (3.5-5.1)  mmol/L


 


Chloride    107  ()  mmol/L


 


Carbon Dioxide    27.9  (21.0-32.0)  mmol/L


 


BUN    17  (7.0-18.0)  mg/dL


 


Creatinine    0.9  (0.8-1.3)  mg/dL


 


Est Cr Clr Drug Dosing    68.72  mL/min


 


Estimated GFR (MDRD)    > 60.0  ml/min


 


Glucose    100  ()  mg/dL


 


Calcium    9.1  (8.5-10.1)  mg/dL


 


Total Bilirubin    1.0  (0.2-1.0)  mg/dL


 


AST    30  (15-37)  IU/L


 


ALT    21  (14-63)  IU/L


 


Alkaline Phosphatase    135 H  ()  U/L


 


Troponin I    < 0.050  (0.000-0.056)  ng/mL


 


Total Protein    7.3  (6.4-8.2)  g/dL


 


Albumin    3.6  (3.4-5.0)  g/dL


 


Globulin    3.7  (2.6-4.0)  g/dL


 


Albumin/Globulin Ratio    1.0  (0.9-1.6)  


 


Urine Color     


 


Urine Appearance     


 


Urine pH     (5.0-8.0)  


 


Ur Specific Gravity     (1.001-1.035)  


 


Urine Protein     (NEGATIVE)  mg/dL


 


Urine Glucose (UA)     (NEGATIVE)  mg/dL


 


Urine Ketones     (NEGATIVE)  mg/dL


 


Urine Occult Blood     (NEGATIVE)  


 


Urine Nitrite     (NEGATIVE)  


 


Urine Bilirubin     (NEGATIVE)  


 


Urine Ictotest     


 


Urine Urobilinogen     (<2.0)  EU/dL


 


Ur Leukocyte Esterase     (NEGATIVE)  


 


Urine RBC     (0-2/HPF)  


 


Urine WBC     (0-5/HPF)  


 


Ur Epithelial Cells     (NONE-FEW)  


 


Urine Bacteria     (NEGATIVE)  














  04/07/19 04/08/19 04/08/19 Range/Units





  16:12 05:05 05:05 


 


WBC   6.87   (4.0-11.0)  K/uL


 


RBC   4.14 L   (4.50-5.90)  M/uL


 


Hgb   11.1 L   (13.0-17.0)  g/dL


 


Hct   34.2 L   (38.0-50.0)  %


 


MCV   82.6   (80.0-98.0)  fL


 


MCH   26.8 L   (27.0-32.0)  pg


 


MCHC   32.5   (31.0-37.0)  g/dL


 


RDW Std Deviation   44.4   (28.0-62.0)  fl


 


RDW Coeff of Rachele   15   (11.0-15.0)  %


 


Plt Count   174   (150-400)  K/uL


 


MPV   8.90   (7.40-12.00)  fL


 


Neut % (Auto)   74.7   (48.0-80.0)  %


 


Lymph % (Auto)   13.8 L   (16.0-40.0)  %


 


Mono % (Auto)   9.2   (0.0-15.0)  %


 


Eos % (Auto)   2.2   (0.0-7.0)  %


 


Baso % (Auto)   0.1   (0.0-1.5)  %


 


Neut # (Auto)   5.1   (1.4-5.7)  K/uL


 


Lymph # (Auto)   1.0   (0.6-2.4)  K/uL


 


Mono # (Auto)   0.6   (0.0-0.8)  K/uL


 


Eos # (Auto)   0.2   (0.0-0.7)  K/uL


 


Baso # (Auto)   0.0   (0.0-0.1)  K/uL


 


Nucleated RBC %   0.0   /100WBC


 


Nucleated RBCs #   0   K/uL


 


INR     


 


Sodium    144  (136-148)  mmol/L


 


Potassium    3.2 L  (3.5-5.1)  mmol/L


 


Chloride    109 H  ()  mmol/L


 


Carbon Dioxide    25.6  (21.0-32.0)  mmol/L


 


BUN    16  (7.0-18.0)  mg/dL


 


Creatinine    0.8  (0.8-1.3)  mg/dL


 


Est Cr Clr Drug Dosing    74.87  mL/min


 


Estimated GFR (MDRD)    > 60.0  ml/min


 


Glucose    94  ()  mg/dL


 


Calcium    8.1 L  (8.5-10.1)  mg/dL


 


Total Bilirubin     (0.2-1.0)  mg/dL


 


AST     (15-37)  IU/L


 


ALT     (14-63)  IU/L


 


Alkaline Phosphatase     ()  U/L


 


Troponin I     (0.000-0.056)  ng/mL


 


Total Protein     (6.4-8.2)  g/dL


 


Albumin     (3.4-5.0)  g/dL


 


Globulin     (2.6-4.0)  g/dL


 


Albumin/Globulin Ratio     (0.9-1.6)  


 


Urine Color  AMELIE    


 


Urine Appearance  SLT CLOUDY    


 


Urine pH  5.5    (5.0-8.0)  


 


Ur Specific Gravity  >= 1.030    (1.001-1.035)  


 


Urine Protein  100 H    (NEGATIVE)  mg/dL


 


Urine Glucose (UA)  NEGATIVE    (NEGATIVE)  mg/dL


 


Urine Ketones  15 H    (NEGATIVE)  mg/dL


 


Urine Occult Blood  LARGE H    (NEGATIVE)  


 


Urine Nitrite  POSITIVE H    (NEGATIVE)  


 


Urine Bilirubin  MODERATE H    (NEGATIVE)  


 


Urine Ictotest  NEGATIVE    


 


Urine Urobilinogen  1.0    (<2.0)  EU/dL


 


Ur Leukocyte Esterase  NEGATIVE    (NEGATIVE)  


 


Urine RBC  100-150    (0-2/HPF)  


 


Urine WBC  1-3    (0-5/HPF)  


 


Ur Epithelial Cells  FEW    (NONE-FEW)  


 


Urine Bacteria  1+ H    (NEGATIVE)  











Med Orders - Current: 


 Current Medications





Fish Oil (Fish Oil)  1 gm PO BID [facility]


   Last Admin: 04/08/19 08:34 Dose:  1 gm


Heparin Sodium (Porcine) (Heparin Sodium)  5,000 units SUBCUT Q8H [facility]


   Last Admin: 04/08/19 04:30 Dose:  5,000 units


Sodium Chloride (Normal Saline)  1,000 mls @ 999 mls/hr IV ASDIRECTED [facility]


   Last Admin: 04/07/19 17:15 Dose:  999 mls/hr


Ceftriaxone Sodium/Dextrose (Rocephin In Dextrose,Iso-Osm 1 Gm/50 Ml)  50 mls @ 

100 mls/hr IV Q24H [facility]


   Last Admin: 04/07/19 21:29 Dose:  100 mls/hr


Latanoprost (Xalatan 0.005% Ophth Soln)  0 ml EYEBOTH BEDTIME [facility]


   Last Admin: 04/08/19 00:04 Dose:  1 drop


Levothyroxine Sodium (Levothyroxine)  25 mcg PO ACBREAKFAST [facility]


   Last Admin: 04/08/19 06:56 Dose:  25 mcg


Oxycodone HCl (Oxycodone)  5 mg PO Q8H PRN


   PRN Reason: Pain


Trazodone HCl (Trazodone)  25 mg PO BEDTIME PRN


   PRN Reason: Insomnia


Triamterene/HCTZ (Maxzide 25-37.5 Mg)  0.5 each PO BIDDIURETIC [facility]


   Last Admin: 04/08/19 08:34 Dose:  0.5 each





Discontinued Medications





Sodium Chloride (Normal Saline)  1,000 mls @ 999 mls/hr IV BOLUS ONE


   Stop: 04/07/19 15:21


   Last Admin: 04/07/19 15:02 Dose:  999 mls/hr


Ceftriaxone Sodium 1 gm/ (Sodium Chloride)  50 mls @ 100 mls/hr IV Q24H [facility]


   Last Admin: 04/07/19 23:08 Dose:  Not Given











- Exam


General: Alert, Oriented


HEENT: Pupils Equal, EOMI, Mucous Membr. Moist/Pink


Neck: Supple


Lungs: Clear to Auscultation, Normal Respiratory Effort


Cardiovascular: Regular Rate, Regular Rhythm


GI/Abdominal Exam: Normal Bowel Sounds, Soft, Non-Tender


Extremities: Normal Inspection, Normal Range of Motion, Non-Tender


Skin: Warm, Dry, Intact





- Problem List Review


Problem List Initiated/Reviewed/Updated: Yes





- My Orders


Last 24 Hours: 


My Active Orders





04/07/19 20:00


Heparin Sodium   5,000 units SUBCUT Q8H 





04/07/19 20:11


Antiembolic Devices [RC] PER UNIT ROUTINE 


Oxygen Therapy [RC] PRN 


Up ad Teresa [RC] ASDIRECTED 


VTE/DVT Education [RC] PER UNIT ROUTINE 


Vital Signs [RC] Q4H 


PT Evaluation and Treatment [CONS] Routine 


Sequential Compression Device [OM.PC] Per Unit Routine 


Resuscitation Status Routine 





04/07/19 20:16


oxyCODONE   5 mg PO Q8H PRN 


traZODone   25 mg PO BEDTIME PRN 





04/07/19 21:00


Fish Oil/Omega-3 Fatty Acids [Fish Oil]   1 gm PO BID 


Latanoprost [Xalatan 0.005% Ophth Soln]   0 ml EYEBOTH BEDTIME 


cefTRIAXone [Rocephin in Dextrose,Iso-Osm 1 GM/50 ML] 50 ml IV Q24H 





04/08/19 07:30


Levothyroxine   25 mcg PO ACBREAKFAST 





04/08/19 08:00


HCTZ/Triamterene [Maxzide 25-37.5 MG]   0.5 each PO BIDDIURETIC 














- Plan


Plan:: 





77 yo male admitted for generalized weakness with UTI.  We will continue 

Rocephin.  PT has been consulted.

## 2019-04-09 LAB
CHLORIDE SERPL-SCNC: 106 MMOL/L (ref 98–107)
SODIUM SERPL-SCNC: 140 MMOL/L (ref 136–148)

## 2019-04-09 RX ADMIN — FLUTICASONE PROPIONATE SCH SPRAY: 50 SPRAY, METERED NASAL at 08:14

## 2019-04-09 RX ADMIN — DEXTROSE SCH UNITS: 10 SOLUTION INTRAVENOUS at 03:46

## 2019-04-09 RX ADMIN — FLUTICASONE PROPIONATE SCH GM: 50 SPRAY, METERED NASAL at 20:41

## 2019-04-09 RX ADMIN — DEXTROSE SCH UNITS: 10 SOLUTION INTRAVENOUS at 11:06

## 2019-04-09 RX ADMIN — OMEGA-3 FATTY ACIDS CAP 1000 MG SCH GM: 1000 CAP at 20:36

## 2019-04-09 RX ADMIN — DEXTROSE SCH UNITS: 10 SOLUTION INTRAVENOUS at 20:36

## 2019-04-09 RX ADMIN — TRIAMTERENE AND HYDROCHLOROTHIAZIDE SCH EACH: 37.5; 25 TABLET ORAL at 13:43

## 2019-04-09 RX ADMIN — OMEGA-3 FATTY ACIDS CAP 1000 MG SCH GM: 1000 CAP at 08:14

## 2019-04-09 RX ADMIN — TRIAMTERENE AND HYDROCHLOROTHIAZIDE SCH EACH: 37.5; 25 TABLET ORAL at 08:14

## 2019-04-09 NOTE — PCM.PN
<Kay Little M - Last Filed: 04/09/19 10:27>





- General Info


Date of Service: 04/09/19


Admission Dx/Problem (Free Text): 


 Admission Diagnosis/Problem





Admission Diagnosis/Problem      Weakness








Subjective Update: 





Continues to feel dizzy and popping in ears. felt Flonase helped some 

yesterday. No chest pain or SOB.


Functional Status: Reports: Pain Controlled, Tolerating Diet, Ambulating (with 

assistance)





- Review of Systems


General: Reports: Weakness


HEENT: Reports: Other (ear popping intermittently. vertigo)


Pulmonary: Reports: No Symptoms.  Denies: Shortness of Breath


Cardiovascular: Reports: No Symptoms.  Denies: Chest Pain


Gastrointestinal: Reports: No Symptoms.  Denies: Abdominal Pain, Nausea, 

Vomiting


Genitourinary: Reports: No Symptoms.  Denies: Dysuria, Frequency, Burning


Musculoskeletal: Reports: No Symptoms


Skin: Reports: No Symptoms


Neurological: Reports: No Symptoms


Psychiatric: Reports: No Symptoms





- Patient Data


Vitals - Most Recent: 


 Last Vital Signs











Temp  98.3 F   04/09/19 07:00


 


Pulse  67   04/09/19 07:00


 


Resp  16   04/09/19 07:00


 


BP  144/79 H  04/09/19 07:00


 


Pulse Ox  96   04/09/19 07:00








 





Orthostatic Blood Pressure [     149/87


Standing]                        


Orthostatic Blood Pressure [     178/92


Sitting]                         


Orthostatic Blood Pressure [     161/109


Supine]                          








Weight - Most Recent: 108.862 kg


I&O - Last 24 Hours: 


 Intake & Output











 04/08/19 04/09/19 04/09/19





 22:59 06:59 14:59


 


Intake Total 790 240 


 


Output Total 220 470 


 


Balance 570 -230 











Lab Results Last 24 Hours: 


 Laboratory Results - last 24 hr











  04/09/19 04/09/19 Range/Units





  05:30 05:30 


 


WBC  5.53   (4.0-11.0)  K/uL


 


RBC  4.18 L   (4.50-5.90)  M/uL


 


Hgb  11.0 L   (13.0-17.0)  g/dL


 


Hct  34.1 L   (38.0-50.0)  %


 


MCV  81.6   (80.0-98.0)  fL


 


MCH  26.3 L   (27.0-32.0)  pg


 


MCHC  32.3   (31.0-37.0)  g/dL


 


RDW Std Deviation  43.4   (28.0-62.0)  fl


 


RDW Coeff of Rachele  15   (11.0-15.0)  %


 


Plt Count  158   (150-400)  K/uL


 


MPV  8.90   (7.40-12.00)  fL


 


Neut % (Auto)  65.1   (48.0-80.0)  %


 


Lymph % (Auto)  18.4   (16.0-40.0)  %


 


Mono % (Auto)  11.0   (0.0-15.0)  %


 


Eos % (Auto)  5.1   (0.0-7.0)  %


 


Baso % (Auto)  0.4   (0.0-1.5)  %


 


Neut # (Auto)  3.6   (1.4-5.7)  K/uL


 


Lymph # (Auto)  1.0   (0.6-2.4)  K/uL


 


Mono # (Auto)  0.6   (0.0-0.8)  K/uL


 


Eos # (Auto)  0.3   (0.0-0.7)  K/uL


 


Baso # (Auto)  0.0   (0.0-0.1)  K/uL


 


Nucleated RBC %  0.0   /100WBC


 


Nucleated RBCs #  0   K/uL


 


Sodium   140  (136-148)  mmol/L


 


Potassium   3.5  (3.5-5.1)  mmol/L


 


Chloride   106  ()  mmol/L


 


Carbon Dioxide   25.6  (21.0-32.0)  mmol/L


 


BUN   21 H  (7.0-18.0)  mg/dL


 


Creatinine   0.8  (0.8-1.3)  mg/dL


 


Est Cr Clr Drug Dosing   74.87  mL/min


 


Estimated GFR (MDRD)   > 60.0  ml/min


 


Glucose   88  ()  mg/dL


 


Calcium   8.3 L  (8.5-10.1)  mg/dL











Pablo Results Last 24 Hours: 


 Microbiology











 04/07/19 16:12 Urine Culture - Final





 Urine, Clean Catch    No Growth











Med Orders - Current: 


 Current Medications





Fish Oil (Fish Oil)  1 gm PO BID [facility]


   Last Admin: 04/09/19 08:14 Dose:  1 gm


Fluticasone Propionate (Flonase)  0 gm NASBOTH DAILY [facility]


   Last Admin: 04/09/19 08:14 Dose:  1 spray


Heparin Sodium (Porcine) (Heparin Sodium)  5,000 units SUBCUT Q8H [facility]


   Last Admin: 04/09/19 03:46 Dose:  5,000 units


Sodium Chloride (Normal Saline)  1,000 mls @ 999 mls/hr IV ASDIRECTED [facility]


   Last Admin: 04/07/19 17:15 Dose:  999 mls/hr


Ceftriaxone Sodium/Dextrose (Rocephin In Dextrose,Iso-Osm 1 Gm/50 Ml)  50 mls @ 

100 mls/hr IV Q24H [facility]


   Last Admin: 04/08/19 20:24 Dose:  100 mls/hr


Latanoprost (Xalatan 0.005% Ophth Soln)  0 ml EYEBOTH BEDTIME [facility]


   Last Admin: 04/08/19 20:24 Dose:  1 drop


Levothyroxine Sodium (Levothyroxine)  25 mcg PO ACBREAKFAST [facility]


   Last Admin: 04/09/19 07:22 Dose:  25 mcg


Loratadine (Claritin)  10 mg PO BEDTIME [facility]


   Last Admin: 04/08/19 20:23 Dose:  10 mg


Montelukast Sodium (Singulair)  10 mg PO BEDTIME [facility]


   Last Admin: 04/08/19 20:23 Dose:  10 mg


Oxycodone HCl (Oxycodone)  5 mg PO Q8H PRN


   PRN Reason: Pain


Trazodone HCl (Trazodone)  25 mg PO BEDTIME PRN


   PRN Reason: Insomnia


Triamterene/HCTZ (Maxzide 25-37.5 Mg)  0.5 each PO BIDDIURETIC [facility]


   Last Admin: 04/09/19 08:14 Dose:  0.5 each





Discontinued Medications





Sodium Chloride (Normal Saline)  1,000 mls @ 999 mls/hr IV BOLUS ONE


   Stop: 04/07/19 15:21


   Last Admin: 04/07/19 15:02 Dose:  999 mls/hr


Ceftriaxone Sodium 1 gm/ (Sodium Chloride)  50 mls @ 100 mls/hr IV Q24H [facility]


   Last Admin: 04/07/19 23:08 Dose:  Not Given











- Exam


General: Alert, Oriented, Cooperative, No Acute Distress


Neck: Supple


Lungs: Clear to Auscultation, Normal Respiratory Effort


Cardiovascular: Regular Rate, Regular Rhythm


GI/Abdominal Exam: Normal Bowel Sounds, Soft, Non-Tender, No Mass


Extremities: Normal Inspection, Normal Range of Motion, Non-Tender, No Pedal 

Edema


Skin: Warm, Dry, Other (radiation burn to posterior L shoulder)


Neurological: No New Focal Deficit


Psy/Mental Status: Alert, Normal Affect, Normal Mood





- Problem List & Annotations


(1) Vertigo


SNOMED Code(s): 758495311


   Code(s): R42 - DIZZINESS AND GIDDINESS   Status: Acute   Current Visit: Yes 

  





(2) Ear barotrauma


SNOMED Code(s): 70030520


   Code(s): T70.0XXA - OTITIC BAROTRAUMA, INITIAL ENCOUNTER   Status: Acute   

Current Visit: Yes   


Qualifiers: 


   Encounter type: initial encounter   Qualified Code(s): T70.0XXA - Otitic 

barotrauma, initial encounter   





(3) Generalized weakness


SNOMED Code(s): 19647152


   Code(s): R53.1 - WEAKNESS   Status: Acute   Current Visit: Yes   





(4) Urinary tract infection


SNOMED Code(s): 67712770


   Code(s): N39.0 - URINARY TRACT INFECTION, SITE NOT SPECIFIED   Status: Acute

   Current Visit: Yes   


Qualifiers: 


   Urinary tract infection type: site unspecified   Hematuria presence: with 

hematuria   Qualified Code(s): N39.0 - Urinary tract infection, site not 

specified; R31.9 - Hematuria, unspecified   





(5) Bladder cancer metastasized to bone


SNOMED Code(s): 74202954, 055503666


   Code(s): C67.9 - MALIGNANT NEOPLASM OF BLADDER, UNSPECIFIED; C79.51 - 

SECONDARY MALIGNANT NEOPLASM OF BONE   Status: Chronic   Current Visit: Yes   





(6) HTN (hypertension)


SNOMED Code(s): 15645919


   Code(s): I10 - ESSENTIAL (PRIMARY) HYPERTENSION   Status: Chronic   Current 

Visit: Yes   


Qualifiers: 


   Hypertension type: essential hypertension   Qualified Code(s): I10 - 

Essential (primary) hypertension   





(7) DALI (obstructive sleep apnea)


SNOMED Code(s): 17824097


   Code(s): G47.33 - OBSTRUCTIVE SLEEP APNEA (ADULT) (PEDIATRIC)   Status: 

Chronic   Current Visit: Yes   





- Problem List Review


Problem List Initiated/Reviewed/Updated: Yes





- My Orders


Last 24 Hours: 


My Active Orders





04/08/19 12:45


Patient Status [ADT] Stat 


Fluticasone Propionate [Flonase]   See Dose Instructions  NASBOTH DAILY 





04/08/19 21:00


Loratadine [Claritin]   10 mg PO BEDTIME 


Montelukast [Singulair]   10 mg PO BEDTIME 














- Plan


Plan:: 





77 yo male admitted for generalized weakness with UTI.  





1. Generalized weakness: Stemming from inability to ambulate and move around 

like normal after barotrauma to inner ear. Started Claritin and Singulair along 

with Flonase yesterday. Felt things open slightly. Will increase Flonase to 

BID. Plan is to go to [place] for rehabilitation and family has ENT appointment 

May 20th in [facility].  PT has been consulted.





2. UTI : UC pending. We will continue Rocephin. 





3. HTN: Stable, continue Maxide





VTE prophylaxis: Heparin





Dispo: Pending placement.





<Finesse Ernst - Last Filed: 04/09/19 17:32>





- General Info


Admission Dx/Problem (Free Text): 





I have seen and examined the patient independently of Kay Little CNP. I have 

discussed the case with her. I have reviewed and agreed with the plan of 

treatment as outlined for this patient by her. Please see orders.





- Patient Data


Vitals - Most Recent: 


 Last Vital Signs











Temp  36.7 C   04/09/19 15:54


 


Pulse  77   04/09/19 15:54


 


Resp  22 H  04/09/19 15:54


 


BP  138/68   04/09/19 15:54


 


Pulse Ox  94 L  04/09/19 15:54








 





Orthostatic Blood Pressure [     149/87


Standing]                        


Orthostatic Blood Pressure [     178/92


Sitting]                         


Orthostatic Blood Pressure [     161/109


Supine]                          








I&O - Last 24 Hours: 


 Intake & Output











 04/09/19 04/09/19 04/09/19





 06:59 14:59 22:59


 


Intake Total 240  840


 


Output Total 470  615


 


Balance -230  225











Lab Results Last 24 Hours: 


 Laboratory Results - last 24 hr











  04/09/19 04/09/19 Range/Units





  05:30 05:30 


 


WBC  5.53   (4.0-11.0)  K/uL


 


RBC  4.18 L   (4.50-5.90)  M/uL


 


Hgb  11.0 L   (13.0-17.0)  g/dL


 


Hct  34.1 L   (38.0-50.0)  %


 


MCV  81.6   (80.0-98.0)  fL


 


MCH  26.3 L   (27.0-32.0)  pg


 


MCHC  32.3   (31.0-37.0)  g/dL


 


RDW Std Deviation  43.4   (28.0-62.0)  fl


 


RDW Coeff of Rachele  15   (11.0-15.0)  %


 


Plt Count  158   (150-400)  K/uL


 


MPV  8.90   (7.40-12.00)  fL


 


Neut % (Auto)  65.1   (48.0-80.0)  %


 


Lymph % (Auto)  18.4   (16.0-40.0)  %


 


Mono % (Auto)  11.0   (0.0-15.0)  %


 


Eos % (Auto)  5.1   (0.0-7.0)  %


 


Baso % (Auto)  0.4   (0.0-1.5)  %


 


Neut # (Auto)  3.6   (1.4-5.7)  K/uL


 


Lymph # (Auto)  1.0   (0.6-2.4)  K/uL


 


Mono # (Auto)  0.6   (0.0-0.8)  K/uL


 


Eos # (Auto)  0.3   (0.0-0.7)  K/uL


 


Baso # (Auto)  0.0   (0.0-0.1)  K/uL


 


Nucleated RBC %  0.0   /100WBC


 


Nucleated RBCs #  0   K/uL


 


Sodium   140  (136-148)  mmol/L


 


Potassium   3.5  (3.5-5.1)  mmol/L


 


Chloride   106  ()  mmol/L


 


Carbon Dioxide   25.6  (21.0-32.0)  mmol/L


 


BUN   21 H  (7.0-18.0)  mg/dL


 


Creatinine   0.8  (0.8-1.3)  mg/dL


 


Est Cr Clr Drug Dosing   74.87  mL/min


 


Estimated GFR (MDRD)   > 60.0  ml/min


 


Glucose   88  ()  mg/dL


 


Calcium   8.3 L  (8.5-10.1)  mg/dL











Pablo Results Last 24 Hours: 


 Microbiology











 04/07/19 16:12 Urine Culture - Final





 Urine, Clean Catch    No Growth











Med Orders - Current: 


 Current Medications





Fish Oil (Fish Oil)  1 gm PO BID [facility]


   Last Admin: 04/09/19 08:14 Dose:  1 gm


Fluticasone Propionate (Flonase)  0 gm NASBOTH BID [facility]


Heparin Sodium (Porcine) (Heparin Sodium)  5,000 units SUBCUT Q8H [facility]


   Last Admin: 04/09/19 11:06 Dose:  5,000 units


Sodium Chloride (Normal Saline)  1,000 mls @ 999 mls/hr IV ASDIRECTED [facility]


   Last Admin: 04/07/19 17:15 Dose:  999 mls/hr


Ceftriaxone Sodium/Dextrose (Rocephin In Dextrose,Iso-Osm 1 Gm/50 Ml)  50 mls @ 

100 mls/hr IV Q24H [facility]


   Last Admin: 04/08/19 20:24 Dose:  100 mls/hr


Latanoprost (Xalatan 0.005% Ophth Soln)  0 ml EYEBOTH BEDTIME [facility]


   Last Admin: 04/08/19 20:24 Dose:  1 drop


Levothyroxine Sodium (Levothyroxine)  25 mcg PO ACBREAKFAST [facility]


   Last Admin: 04/09/19 07:22 Dose:  25 mcg


Loratadine (Claritin)  10 mg PO BEDTIME [facility]


   Last Admin: 04/08/19 20:23 Dose:  10 mg


Meclizine HCl (Antivert)  50 mg PO BID [facility]


   Last Admin: 04/09/19 13:44 Dose:  50 mg


Montelukast Sodium (Singulair)  10 mg PO BEDTIME [facility]


   Last Admin: 04/08/19 20:23 Dose:  10 mg


Oxycodone HCl (Oxycodone)  5 mg PO Q8H PRN


   PRN Reason: Pain


   Last Admin: 04/09/19 11:02 Dose:  5 mg


Trazodone HCl (Trazodone)  25 mg PO BEDTIME PRN


   PRN Reason: Insomnia


Triamterene/HCTZ (Maxzide 25-37.5 Mg)  0.5 each PO BIDDIURETIC [facility]


   Last Admin: 04/09/19 13:43 Dose:  0.5 each





Discontinued Medications





Fluticasone Propionate (Flonase)  0 gm NASBOTH DAILY [facility]


   Last Admin: 04/09/19 08:14 Dose:  1 spray


Sodium Chloride (Normal Saline)  1,000 mls @ 999 mls/hr IV BOLUS ONE


   Stop: 04/07/19 15:21


   Last Admin: 04/07/19 15:02 Dose:  999 mls/hr


Ceftriaxone Sodium 1 gm/ (Sodium Chloride)  50 mls @ 100 mls/hr IV Q24H [facility]


   Last Admin: 04/07/19 23:08 Dose:  Not Given

## 2019-04-10 LAB
CHLORIDE SERPL-SCNC: 104 MMOL/L (ref 98–107)
SODIUM SERPL-SCNC: 141 MMOL/L (ref 136–148)

## 2019-04-10 RX ADMIN — OMEGA-3 FATTY ACIDS CAP 1000 MG SCH GM: 1000 CAP at 20:01

## 2019-04-10 RX ADMIN — OMEGA-3 FATTY ACIDS CAP 1000 MG SCH GM: 1000 CAP at 09:16

## 2019-04-10 RX ADMIN — DEXTROSE SCH UNITS: 10 SOLUTION INTRAVENOUS at 20:01

## 2019-04-10 RX ADMIN — FLUTICASONE PROPIONATE SCH SPRAY: 50 SPRAY, METERED NASAL at 09:24

## 2019-04-10 RX ADMIN — TRIAMTERENE AND HYDROCHLOROTHIAZIDE SCH EACH: 37.5; 25 TABLET ORAL at 09:17

## 2019-04-10 RX ADMIN — TRIAMTERENE AND HYDROCHLOROTHIAZIDE SCH EACH: 37.5; 25 TABLET ORAL at 13:58

## 2019-04-10 RX ADMIN — DEXTROSE SCH UNITS: 10 SOLUTION INTRAVENOUS at 04:59

## 2019-04-10 RX ADMIN — DEXTROSE SCH UNITS: 10 SOLUTION INTRAVENOUS at 11:42

## 2019-04-10 RX ADMIN — FLUTICASONE PROPIONATE SCH SPRAY: 50 SPRAY, METERED NASAL at 20:02

## 2019-04-10 NOTE — PCM.PN
<Kay Little M - Last Filed: 04/10/19 09:56>





- General Info


Date of Service: 04/10/19


Admission Dx/Problem (Free Text): 





Weakness


Subjective Update: 





Feeling good this morning, no dizziness. Ears feel full now, better overnight. 

No chest pain or SOB. 


Functional Status: Reports: Tolerating Diet, Ambulating, Urinating





- Review of Systems


General: Reports: No Symptoms.  Denies: Fever, Weakness, Fatigue, Malaise


HEENT: Denies: Ear Pain (fullness noted with intermittent popping), Headaches, 

Sore Throat, Visual Changes


Pulmonary: Reports: No Symptoms.  Denies: Shortness of Breath, Cough, Sputum


Cardiovascular: Reports: No Symptoms.  Denies: Chest Pain


Gastrointestinal: Reports: No Symptoms.  Denies: Abdominal Pain, Nausea, 

Vomiting


Genitourinary: Reports: No Symptoms.  Denies: Dysuria, Frequency, Burning


Musculoskeletal: Reports: No Symptoms


Skin: Reports: No Symptoms


Neurological: Reports: No Symptoms


Psychiatric: Reports: No Symptoms





- Patient Data


Vitals - Most Recent: 


 Last Vital Signs











Temp  98.0 F   04/10/19 08:00


 


Pulse  67   04/10/19 09:17


 


Resp  15   04/10/19 08:00


 


BP  145/74 H  04/10/19 09:17


 


Pulse Ox  95   04/10/19 08:00








 





Orthostatic Blood Pressure [     149/87


Standing]                        


Orthostatic Blood Pressure [     178/92


Sitting]                         


Orthostatic Blood Pressure [     161/109


Supine]                          








Weight - Most Recent: 108.862 kg


I&O - Last 24 Hours: 


 Intake & Output











 04/09/19 04/10/19 04/10/19





 22:59 06:59 14:59


 


Intake Total 840 400 


 


Output Total 615 950 


 


Balance 225 -550 











Lab Results Last 24 Hours: 


 Laboratory Results - last 24 hr











  04/10/19 Range/Units





  04:50 


 


Sodium  141  (136-148)  mmol/L


 


Potassium  3.9  (3.5-5.1)  mmol/L


 


Chloride  104  ()  mmol/L


 


Carbon Dioxide  30.1  (21.0-32.0)  mmol/L


 


BUN  23 H  (7.0-18.0)  mg/dL


 


Creatinine  0.9  (0.8-1.3)  mg/dL


 


Est Cr Clr Drug Dosing  66.55  mL/min


 


Estimated GFR (MDRD)  > 60.0  ml/min


 


Glucose  94  ()  mg/dL


 


Calcium  8.4 L  (8.5-10.1)  mg/dL











Pablo Results Last 24 Hours: 


 Microbiology











 04/07/19 16:12 Urine Culture - Final





 Urine, Clean Catch    No Growth











Med Orders - Current: 


 Current Medications





Fish Oil (Fish Oil)  1 gm PO BID [facility]


   Last Admin: 04/10/19 09:16 Dose:  1 gm


Fluticasone Propionate (Flonase)  0 gm NASBOTH BID [facility]


   Last Admin: 04/10/19 09:24 Dose:  2 spray


Heparin Sodium (Porcine) (Heparin Sodium)  5,000 units SUBCUT Q8H [facility]


   Last Admin: 04/10/19 04:59 Dose:  5,000 units


Sodium Chloride (Normal Saline)  1,000 mls @ 999 mls/hr IV ASDIRECTED [facility]


   Last Admin: 04/07/19 17:15 Dose:  999 mls/hr


Latanoprost (Xalatan 0.005% Ophth Soln)  0 ml EYEBOTH BEDTIME [facility]


   Last Admin: 04/09/19 20:37 Dose:  1 drop


Levothyroxine Sodium (Levothyroxine)  25 mcg PO ACBREAKFAST [facility]


   Last Admin: 04/10/19 07:15 Dose:  25 mcg


Loratadine (Claritin)  10 mg PO BEDTIME [facility]


   Last Admin: 04/09/19 20:36 Dose:  10 mg


Meclizine HCl (Antivert)  50 mg PO BID [facility]


   Last Admin: 04/10/19 09:14 Dose:  50 mg


Metoprolol Tartrate (Lopressor)  50 mg PO BID [facility]


   Last Admin: 04/10/19 09:17 Dose:  50 mg


Montelukast Sodium (Singulair)  10 mg PO BEDTIME [facility]


   Last Admin: 04/09/19 20:36 Dose:  10 mg


Oxycodone HCl (Oxycodone)  5 mg PO Q8H PRN


   PRN Reason: Pain


   Last Admin: 04/09/19 11:02 Dose:  5 mg


Tramadol HCl (Ultram)  50 mg PO Q6H PRN


   PRN Reason: Pain


Trazodone HCl (Trazodone)  25 mg PO BEDTIME PRN


   PRN Reason: Insomnia


Triamterene/HCTZ (Maxzide 25-37.5 Mg)  0.5 each PO BIDDIURETIC [facility]


   Last Admin: 04/10/19 09:17 Dose:  0.5 each





Discontinued Medications





Fluticasone Propionate (Flonase)  0 gm NASBOTH DAILY [facility]


   Last Admin: 04/09/19 08:14 Dose:  1 spray


Sodium Chloride (Normal Saline)  1,000 mls @ 999 mls/hr IV BOLUS ONE


   Stop: 04/07/19 15:21


   Last Admin: 04/07/19 15:02 Dose:  999 mls/hr


Ceftriaxone Sodium 1 gm/ (Sodium Chloride)  50 mls @ 100 mls/hr IV Q24H [facility]


   Last Admin: 04/07/19 23:08 Dose:  Not Given


Ceftriaxone Sodium/Dextrose (Rocephin In Dextrose,Iso-Osm 1 Gm/50 Ml)  50 mls @ 

100 mls/hr IV Q24H [facility]


   Last Admin: 04/09/19 20:37 Dose:  100 mls/hr











- Exam


General: Alert, Oriented, Cooperative, No Acute Distress


Neck: Supple


Lungs: Clear to Auscultation, Normal Respiratory Effort


Cardiovascular: Regular Rate, Regular Rhythm


GI/Abdominal Exam: Normal Bowel Sounds, Soft, Non-Tender


Extremities: Normal Inspection, Normal Range of Motion, Non-Tender, No Pedal 

Edema


Neurological: No New Focal Deficit


Psy/Mental Status: Alert, Normal Affect, Normal Mood





- Problem List & Annotations


(1) Vertigo


SNOMED Code(s): 982316815


   Code(s): R42 - DIZZINESS AND GIDDINESS   Status: Acute   Current Visit: Yes 

  





(2) Ear barotrauma


SNOMED Code(s): 94214392


   Code(s): T70.0XXA - OTITIC BAROTRAUMA, INITIAL ENCOUNTER   Status: Acute   

Current Visit: Yes   


Qualifiers: 


   Encounter type: initial encounter   Qualified Code(s): T70.0XXA - Otitic 

barotrauma, initial encounter   





(3) Generalized weakness


SNOMED Code(s): 79268070


   Code(s): R53.1 - WEAKNESS   Status: Acute   Current Visit: Yes   





(4) Urinary tract infection


SNOMED Code(s): 95924890


   Code(s): N39.0 - URINARY TRACT INFECTION, SITE NOT SPECIFIED   Status: Acute

   Current Visit: Yes   


Qualifiers: 


   Urinary tract infection type: site unspecified   Hematuria presence: with 

hematuria   Qualified Code(s): N39.0 - Urinary tract infection, site not 

specified; R31.9 - Hematuria, unspecified   





(5) Bladder cancer metastasized to bone


SNOMED Code(s): 11840636, 579611806


   Code(s): C67.9 - MALIGNANT NEOPLASM OF BLADDER, UNSPECIFIED; C79.51 - 

SECONDARY MALIGNANT NEOPLASM OF BONE   Status: Chronic   Current Visit: Yes   





(6) HTN (hypertension)


SNOMED Code(s): 70165859


   Code(s): I10 - ESSENTIAL (PRIMARY) HYPERTENSION   Status: Chronic   Current 

Visit: Yes   


Qualifiers: 


   Hypertension type: essential hypertension   Qualified Code(s): I10 - 

Essential (primary) hypertension   





(7) DALI (obstructive sleep apnea)


SNOMED Code(s): 45945741


   Code(s): G47.33 - OBSTRUCTIVE SLEEP APNEA (ADULT) (PEDIATRIC)   Status: 

Chronic   Current Visit: Yes   





- Problem List Review


Problem List Initiated/Reviewed/Updated: Yes





- My Orders


Last 24 Hours: 


My Active Orders





04/09/19 10:28


Telemetry Monitor Discontinue [Cardiac Monitoring Discontinue] [RC] Click to 

Edit 





04/09/19 12:45


Meclizine [Antivert]   50 mg PO BID 





04/09/19 21:00


Fluticasone Propionate [Flonase]   See Dose Instructions  NASBOTH BID 





04/10/19 07:55


traMADol [Ultram]   50 mg PO Q6H PRN 





04/10/19 09:00


Metoprolol Tartrate [Lopressor]   50 mg PO BID 





04/11/19 05:11


BMP [BASIC METABOLIC PANEL,BMP] [CHEM] AM 














- Plan


Plan:: 





79 yo male admitted for generalized weakness with UTI.  





1. Generalized weakness: Stemming from inability to ambulate and move around 

like normal after barotrauma to inner ear. Continue Claritin, Singulair and 

Flonase. No dizziness noted this morning. Plan is to go to [place] for 

rehabilitation and family has ENT appointment May 20th in [facility].  PT has 

been consulted.





2. UTI : UC no growth. We will discontinue Rocephin. 





3. HTN: Stable, continue Maxide





VTE prophylaxis: Heparin





Dispo: Pending placement, [facility] tomorrow.





<Finesse Ernst - Last Filed: 04/10/19 12:36>





- General Info


Admission Dx/Problem (Free Text): 





I have seen and examined the patient independently of Kay Little CNP. I have 

discussed the case with her. I have reviewed and agreed with the plan of 

treatment as outlined for this patient by her. Please see orders. 





- Patient Data


Vitals - Most Recent: 


 Last Vital Signs











Temp  36.7 C   04/10/19 08:00


 


Pulse  67   04/10/19 09:17


 


Resp  15   04/10/19 08:00


 


BP  145/74 H  04/10/19 09:17


 


Pulse Ox  95   04/10/19 08:00








 





Orthostatic Blood Pressure [     149/87


Standing]                        


Orthostatic Blood Pressure [     178/92


Sitting]                         


Orthostatic Blood Pressure [     161/109


Supine]                          








I&O - Last 24 Hours: 


 Intake & Output











 04/09/19 04/10/19 04/10/19





 22:59 06:59 14:59


 


Intake Total 840 400 


 


Output Total 615 950 


 


Balance 225 -550 











Lab Results Last 24 Hours: 


 Laboratory Results - last 24 hr











  04/10/19 Range/Units





  04:50 


 


Sodium  141  (136-148)  mmol/L


 


Potassium  3.9  (3.5-5.1)  mmol/L


 


Chloride  104  ()  mmol/L


 


Carbon Dioxide  30.1  (21.0-32.0)  mmol/L


 


BUN  23 H  (7.0-18.0)  mg/dL


 


Creatinine  0.9  (0.8-1.3)  mg/dL


 


Est Cr Clr Drug Dosing  66.55  mL/min


 


Estimated GFR (MDRD)  > 60.0  ml/min


 


Glucose  94  ()  mg/dL


 


Calcium  8.4 L  (8.5-10.1)  mg/dL











Pablo Results Last 24 Hours: 


 Microbiology











 04/07/19 16:12 Urine Culture - Final





 Urine, Clean Catch    No Growth











Med Orders - Current: 


 Current Medications





Fish Oil (Fish Oil)  1 gm PO BID [facility]


   Last Admin: 04/10/19 09:16 Dose:  1 gm


Fluticasone Propionate (Flonase)  0 gm NASBOTH BID [facility]


   Last Admin: 04/10/19 09:24 Dose:  2 spray


Heparin Sodium (Porcine) (Heparin Sodium)  5,000 units SUBCUT Q8H [facility]


   Last Admin: 04/10/19 11:42 Dose:  5,000 units


Sodium Chloride (Normal Saline)  1,000 mls @ 999 mls/hr IV ASDIRECTED [facility]


   Last Admin: 04/07/19 17:15 Dose:  999 mls/hr


Latanoprost (Xalatan 0.005% Ophth Soln)  0 ml EYEBOTH BEDTIME [facility]


   Last Admin: 04/09/19 20:37 Dose:  1 drop


Levothyroxine Sodium (Levothyroxine)  25 mcg PO ACBREAKFAST [facility]


   Last Admin: 04/10/19 07:15 Dose:  25 mcg


Loratadine (Claritin)  10 mg PO BEDTIME [facility]


   Last Admin: 04/09/19 20:36 Dose:  10 mg


Meclizine HCl (Antivert)  50 mg PO BID [facility]


   Last Admin: 04/10/19 09:14 Dose:  50 mg


Metoprolol Tartrate (Lopressor)  50 mg PO BID [facility]


   Last Admin: 04/10/19 09:17 Dose:  50 mg


Montelukast Sodium (Singulair)  10 mg PO BEDTIME [facility]


   Last Admin: 04/09/19 20:36 Dose:  10 mg


Oxycodone HCl (Oxycodone)  5 mg PO Q8H PRN


   PRN Reason: Pain


   Last Admin: 04/09/19 11:02 Dose:  5 mg


Tramadol HCl (Ultram)  50 mg PO Q6H PRN


   PRN Reason: Pain


   Last Admin: 04/10/19 11:39 Dose:  50 mg


Trazodone HCl (Trazodone)  25 mg PO BEDTIME PRN


   PRN Reason: Insomnia


Triamterene/HCTZ (Maxzide 25-37.5 Mg)  0.5 each PO BIDDIURETIC [facility]


   Last Admin: 04/10/19 09:17 Dose:  0.5 each





Discontinued Medications





Fluticasone Propionate (Flonase)  0 gm NASBOTH DAILY [facility]


   Last Admin: 04/09/19 08:14 Dose:  1 spray


Sodium Chloride (Normal Saline)  1,000 mls @ 999 mls/hr IV BOLUS ONE


   Stop: 04/07/19 15:21


   Last Admin: 04/07/19 15:02 Dose:  999 mls/hr


Ceftriaxone Sodium 1 gm/ (Sodium Chloride)  50 mls @ 100 mls/hr IV Q24H [facility]


   Last Admin: 04/07/19 23:08 Dose:  Not Given


Ceftriaxone Sodium/Dextrose (Rocephin In Dextrose,Iso-Osm 1 Gm/50 Ml)  50 mls @ 

100 mls/hr IV Q24H [facility]


   Last Admin: 04/09/19 20:37 Dose:  100 mls/hr

## 2019-04-11 VITALS — DIASTOLIC BLOOD PRESSURE: 58 MMHG | SYSTOLIC BLOOD PRESSURE: 111 MMHG

## 2019-04-11 RX ADMIN — TRIAMTERENE AND HYDROCHLOROTHIAZIDE SCH EACH: 37.5; 25 TABLET ORAL at 08:16

## 2019-04-11 RX ADMIN — DEXTROSE SCH UNITS: 10 SOLUTION INTRAVENOUS at 04:24

## 2019-04-11 RX ADMIN — DEXTROSE SCH UNITS: 10 SOLUTION INTRAVENOUS at 12:20

## 2019-04-11 RX ADMIN — OMEGA-3 FATTY ACIDS CAP 1000 MG SCH GM: 1000 CAP at 08:19

## 2019-04-11 RX ADMIN — FLUTICASONE PROPIONATE SCH SPRAY: 50 SPRAY, METERED NASAL at 08:19

## 2019-04-29 ENCOUNTER — HOSPITAL ENCOUNTER (EMERGENCY)
Dept: HOSPITAL 56 - MW.ED | Age: 79
Discharge: NURSING, REHAB OR LONG-TERM CARE | End: 2019-04-29
Payer: MEDICARE

## 2019-04-29 VITALS — SYSTOLIC BLOOD PRESSURE: 109 MMHG | DIASTOLIC BLOOD PRESSURE: 67 MMHG

## 2019-04-29 DIAGNOSIS — K21.9: ICD-10-CM

## 2019-04-29 DIAGNOSIS — G72.89: Primary | ICD-10-CM

## 2019-04-29 DIAGNOSIS — Z85.51: ICD-10-CM

## 2019-04-29 DIAGNOSIS — Z87.891: ICD-10-CM

## 2019-04-29 DIAGNOSIS — I10: ICD-10-CM

## 2019-04-29 DIAGNOSIS — Z79.899: ICD-10-CM

## 2019-04-29 NOTE — EDM.PDOC
ED HPI GENERAL MEDICAL PROBLEM





- General


Chief Complaint: Back Pain or Injury


Stated Complaint: BACK PAIN FROM [facility]


Time Seen by Provider: 04/29/19 14:23


Source of Information: Reports: Patient, Family


History Limitations: Reports: No Limitations





- History of Present Illness


INITIAL COMMENTS - FREE TEXT/NARRATIVE: 


HISTORY AND PHYSICAL:





History of present illness:


Patient is a 78 year old male who presents to the ED today from [place] for 

concern of progressive leg weakness. Over the past 2 months, patient has 

noticed a decrease in his ability to walk and take care of himself. Patient 

states prior to 2 months ago, he was fully ambulatory. Initially, patient 

states he had a few falls/ concern for losing consciousness which led him to a 

visit in the ED on 04/07/19, at that time he was admitted to the hospital and 

had received a workup for syncope/falls/weakness. After discharge of the 

hospital, he went into [place] for rehab with a goal to improve his muscle 

strength following hospital stay. Patient states rehab has not helped his 

symptoms, but rather since starting rehab, he has noticed his walking becoming 

even more difficult/progressively weak. Patient states he met with Dr. Peterson, 

his PCP and Dr. Amor, his oncologist, since the loss of ability to walk and 

was scheduled for scans. He states he has been placed on Prednisone and was 

supposed to get several MRI scans/PET scans but these scans are set up for a 

few weeks from now. Patient sent to the ED by [place] rehab who noted he is no 

longer able to use his legs progressively over the past week and requires 

complete [place] transfer for mobility. Patient states he has noticed over the 

past week an increase in his chronic back pain. Patient states he always has 

had back pain ever since he was young but over the past week has noticed it has 

become worse than usual.





Patient has a history of bladder cancer, stage 4 with metastasis to the left 

shoulder.





Patient denies fever, chills, chest pain, shortness of breath, or cough. Denies 

headache, neck stiff ness, change in vision, syncope, or near syncope. Denies 

nausea, vomiting, abdominal pain, diarrhea, constipation, or dysuria. Has not 

noted any blood in urine or stool. Patient has been eating and drinking 

appropriately.





Review of systems: 


As per history of present illness and below otherwise all systems reviewed and 

negative.





Past medical history: 


As per history of present illness and as reviewed below otherwise 

noncontributory.





Surgical history: 


As per history of present illness and as reviewed below otherwise 

noncontributory.





Social history: 


See social history for further information





Family history: 


As per history of present illness and as reviewed below otherwise 

noncontributory.





Physical exam:


General: Patient is alert, oriented, and in no acute distress. Patient sitting 

comfortably in wheelchair. [place] lift sling in place under patient.


HEENT: Atraumatic, normocephalic, pupils equal and reactive bilaterally, 

negative for conjunctival pallor or scleral icterus, mucous membranes moist, 

TMs normal bilaterally, throat clear, neck supple, nontender, trachea midline. 

No drooling or trismus noted. No meningeal signs. No hot potato voice noted. 


Lungs: Clear to auscultation, breath sounds equal bilaterally, chest nontender.


Heart: S1S2, regular rate and rhythm without overt murmur


Abdomen: Soft, nondistended, nontender. Negative for masses or 

hepatosplenomegaly. Negative for costovertebral tenderness.


Pelvis: Stable nontender.


Genitourinary: Deferred.


Rectal: Deferred.


Skin: Intact, warm, dry. No lesions or rashes noted.


Extremities: Atraumatic, negative for cords or calf pain. 


Neuro: Awake, alert, oriented. Cranial nerves II through XII unremarkable. 

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 





Notes:


Dr. Delaney was directly involved in patients care.


Call to [facility] to Dr. Hunter. Accepting of transfer. 

Ground transfer arranged. Voices understanding and is agreeable to plan of 

care. Denies any further questions or concerns at this time.





Diagnostics:


None





Therapeutics:


Saline lock





Impression: 


Bilateral leg paralysis


History of bladder cancer with metastasis





Plan:


1. Transfer to [facility] to Dr. Hunter





Definitive disposition and diagnosis as appropriate pending reevaluation and 

review of above.





  ** Upper Back


Pain Score (Numeric/FACES): 7





- Related Data


 Allergies











Allergy/AdvReac Type Severity Reaction Status Date / Time


 


No Known Allergies Allergy   Verified 04/29/19 14:25











Home Meds: 


 Home Meds





Triamterene/Hydrochlorothiazid [Triamterene-HCTZ 37.5-25 MG] 0.5 tab PO BID 06/

04/15 [History]


Fish Oil/Omega-3 Fatty Acids [Fish Oil 1,000 MG] 1 tab PO BID 05/18/17 [History]


Latanoprost [Xalatan 0.005% Ophth Soln] 1 drop EYEBOTH BEDTIME 05/18/17 [History

]


traZODone HCl [Trazodone HCl] 25 mg PO BEDTIME PRN 05/18/17 [History]


Levothyroxine 50 mcg PO ACBREAKFAST 12/08/18 [History]


Acetaminophen [Pain Relief Extra Strength] 500 mg PO BID PRN 04/09/19 [History]


Loratadine [Claritin] 10 mg PO DAILY 04/09/19 [History]


Meclizine [Antivert] 50 mg PO BID 04/09/19 [History]


Metoprolol Tartrate 50 mg PO BID 04/09/19 [History]


Ondansetron [Zofran] 8 mg PO Q8H PRN 04/09/19 [History]


Potassium Chloride [Klor-Con 10] 10 meq PO Q2D 04/09/19 [History]


Prochlorperazine Maleate [Compazine] 10 mg PO TID PRN 04/09/19 [History]


Fluticasone Propionate [Flonase] 2 spray NASBOTH DAILY #1 bottle 04/11/19 [Rx]


Montelukast [Singulair] 10 mg PO BEDTIME #30 tablet 04/11/19 [Rx]


oxyCODONE 5 mg PO Q8H PRN #15 tab 04/11/19 [Rx]











Past Medical History





- Past Health History


Medical/Surgical History: Denies Medical/Surgical History


HEENT History: Reports: Allergic Rhinitis, Cataract, Glaucoma, Other (See Below)


Other HEENT History: wears glasses. Nasal congestion.


Cardiovascular History: Reports: Hypertension


Respiratory History: Reports: Sleep Apnea


Other Respiratory History: does not use CPAP


Gastrointestinal History: Reports: GERD


Genitourinary History: Reports: Other (See Below)


Other Genitourinary History: hx of bladder tumor


Musculoskeletal History: Reports: Arthritis, Back Pain, Chronic, Fracture, 

Osteoarthritis


Other Musculoskeletal History: hx of fx thumb, left shoulder pain


Neurological History: Reports: None


Psychiatric History: Reports: None


Endocrine/Metabolic History: Reports: Obesity/BMI 30+


Hematologic History: Reports: None


Immunologic History: Reports: None


Oncologic (Cancer) History: Reports: Bladder


Other Oncologic History: Malignant neoplasm of bladder


Dermatologic History: Reports: None





- Infectious Disease History


Infectious Disease History: Reports: Chicken Pox, Measles, Mumps





- Past Surgical History


Head Surgeries/Procedures: Reports: None


HEENT Surgical History: Reports: Cataract Surgery, Other (See Below)


Other HEENT Surgeries/Procedures: hx of Blepharoplasty


Cardiovascular Surgical History: Reports: None


Respiratory Surgical History: Reports: None


GI Surgical History: Reports: Appendectomy


Male  Surgical History: Reports: TURBT-Transurethral Resection of Bladder 

Tumor


Endocrine Surgical History: Reports: None


Neurological Surgical History: Reports: None


Musculoskeletal Surgical History: Reports: None


Oncologic Surgical History: Reports: None


Dermatological Surgical History: Reports: None





Social & Family History





- Family History


Family Medical History: Noncontributory


HEENT: Reports: None





- Tobacco Use


Smoking Status *Q: Former Smoker


Used Tobacco, but Quit: Yes


Month/Year Tobacco Last Used: 1969





- Caffeine Use


Caffeine Use: Reports: Coffee


Caffeine Use Comment: 1-2 cups of coffee per day, 1 coke per day





- Recreational Drug Use


Recreational Drug Use: No





ED ROS GENERAL





- Review of Systems


Review Of Systems: ROS reveals no pertinent complaints other than HPI.





ED EXAM, NEURO





- Physical Exam


Exam: See Below (see dictation)





Course





- Vital Signs


Last Recorded V/S: 


 Last Vital Signs











Temp  36.4 C   04/29/19 14:27


 


Pulse  69   04/29/19 14:27


 


Resp  16   04/29/19 14:27


 


BP  109/67   04/29/19 14:27


 


Pulse Ox  97   04/29/19 14:27














Departure





- Departure


Time of Disposition: 15:46


Disposition: DC/Tfer to Acute Hospital [facility]


Clinical Impression: 


 Flaccid paralysis of legs, History of bladder cancer, History of metastatic 

neoplastic disease








- Discharge Information


Referrals: 


[facility] [Primary Care Provider] - 


Forms:  ED Department Discharge

## 2019-06-04 ENCOUNTER — HOSPITAL ENCOUNTER (INPATIENT)
Dept: HOSPITAL 56 - MW.ED | Age: 79
LOS: 4 days | Discharge: NURSING, REHAB OR LONG-TERM CARE | DRG: 194 | End: 2019-06-08
Attending: INTERNAL MEDICINE | Admitting: INTERNAL MEDICINE
Payer: MEDICARE

## 2019-06-04 DIAGNOSIS — D64.9: ICD-10-CM

## 2019-06-04 DIAGNOSIS — I95.9: ICD-10-CM

## 2019-06-04 DIAGNOSIS — M19.90: ICD-10-CM

## 2019-06-04 DIAGNOSIS — Z87.891: ICD-10-CM

## 2019-06-04 DIAGNOSIS — E87.1: ICD-10-CM

## 2019-06-04 DIAGNOSIS — Z79.899: ICD-10-CM

## 2019-06-04 DIAGNOSIS — E66.9: ICD-10-CM

## 2019-06-04 DIAGNOSIS — E87.6: ICD-10-CM

## 2019-06-04 DIAGNOSIS — K21.9: ICD-10-CM

## 2019-06-04 DIAGNOSIS — C67.9: ICD-10-CM

## 2019-06-04 DIAGNOSIS — R50.9: ICD-10-CM

## 2019-06-04 DIAGNOSIS — Z79.890: ICD-10-CM

## 2019-06-04 DIAGNOSIS — E86.0: ICD-10-CM

## 2019-06-04 DIAGNOSIS — Z85.51: ICD-10-CM

## 2019-06-04 DIAGNOSIS — J18.9: Primary | ICD-10-CM

## 2019-06-04 DIAGNOSIS — N39.0: ICD-10-CM

## 2019-06-04 DIAGNOSIS — G47.33: ICD-10-CM

## 2019-06-04 DIAGNOSIS — G47.30: ICD-10-CM

## 2019-06-04 DIAGNOSIS — H26.9: ICD-10-CM

## 2019-06-04 DIAGNOSIS — M19.91: ICD-10-CM

## 2019-06-04 DIAGNOSIS — G89.29: ICD-10-CM

## 2019-06-04 DIAGNOSIS — R00.0: ICD-10-CM

## 2019-06-04 DIAGNOSIS — I10: ICD-10-CM

## 2019-06-04 DIAGNOSIS — Z90.49: ICD-10-CM

## 2019-06-04 DIAGNOSIS — C79.51: ICD-10-CM

## 2019-06-04 DIAGNOSIS — R65.10: ICD-10-CM

## 2019-06-04 DIAGNOSIS — M54.9: ICD-10-CM

## 2019-06-04 DIAGNOSIS — J30.9: ICD-10-CM

## 2019-06-04 DIAGNOSIS — H40.9: ICD-10-CM

## 2019-06-04 LAB
CHLORIDE SERPL-SCNC: 89 MMOL/L (ref 98–107)
SODIUM SERPL-SCNC: 127 MMOL/L (ref 136–148)

## 2019-06-04 PROCEDURE — 83605 ASSAY OF LACTIC ACID: CPT

## 2019-06-04 PROCEDURE — 81001 URINALYSIS AUTO W/SCOPE: CPT

## 2019-06-04 PROCEDURE — 71045 X-RAY EXAM CHEST 1 VIEW: CPT

## 2019-06-04 PROCEDURE — 96365 THER/PROPH/DIAG IV INF INIT: CPT

## 2019-06-04 PROCEDURE — 36415 COLL VENOUS BLD VENIPUNCTURE: CPT

## 2019-06-04 PROCEDURE — 87040 BLOOD CULTURE FOR BACTERIA: CPT

## 2019-06-04 PROCEDURE — A4217 STERILE WATER/SALINE, 500 ML: HCPCS

## 2019-06-04 PROCEDURE — 99285 EMERGENCY DEPT VISIT HI MDM: CPT

## 2019-06-04 PROCEDURE — 80053 COMPREHEN METABOLIC PANEL: CPT

## 2019-06-04 PROCEDURE — P9016 RBC LEUKOCYTES REDUCED: HCPCS

## 2019-06-04 PROCEDURE — 96361 HYDRATE IV INFUSION ADD-ON: CPT

## 2019-06-04 PROCEDURE — 74176 CT ABD & PELVIS W/O CONTRAST: CPT

## 2019-06-04 PROCEDURE — 96367 TX/PROPH/DG ADDL SEQ IV INF: CPT

## 2019-06-04 PROCEDURE — 87086 URINE CULTURE/COLONY COUNT: CPT

## 2019-06-04 PROCEDURE — 85025 COMPLETE CBC W/AUTO DIFF WBC: CPT

## 2019-06-04 PROCEDURE — 84484 ASSAY OF TROPONIN QUANT: CPT

## 2019-06-04 NOTE — CT
INDICATION:



Low blood pressure  



TECHNIQUE:



CT abdomen and pelvis acquired without IV contrast.



COMPARISON:



07/26/2018 



FINDINGS:



Lower chest: Right lower lobe patchy opacity consistent with atelectasis 

and/or infiltrate. 



Liver: Unremarkable.  



Spleen: Unremarkable.  



Pancreas: Unremarkable.  



Gallbladder and bile ducts: Distended gallbladder with cholelithiasis. No 

gallbladder wall thickening or pericholecystic fluid. Normal common bile 

duct. 



Kidneys: Unremarkable.  



Adrenal glands: Unremarkable.  



GI tract: Diffuse colonic fecal retention. Appendix is normal.  



Vascular structures: Unremarkable.  



Lymph nodes: Unremarkable.  



Miscellaneous: Unremarkable.  No free air or significant free fluid.  



Pelvic Organs: Unremarkable.  



Bones: Unremarkable for age.  



IMPRESSION:



Diffuse colonic fecal retention. 



Distended gallbladder with cholelithiasis. No gallbladder wall thickening 

or pericholecystic fluid. Normal common bile duct. Correlate with right 

upper quadrant pain. 



Patchy opacity right lower lobe. Correlate with pneumonia clinically.



Dictated by Morris Grullon MD @ 6/4/2019 3:40:13 PM



Please note that all CT scans at this facility use dose modulation, 

iterative reconstruction, and/or weight-based dosing when appropriate to 

reduce radiation dose to as low as reasonably achievable.



Dictated by: Morris Grullon MD @ 06/04/2019 15:40:21



(Electronically Signed)

## 2019-06-04 NOTE — PCM.HP
H&P History of Present Illness





- General


Date of Service: 06/05/19


Admit Problem/Dx: 


 Admission Diagnosis/Problem





Admission Diagnosis/Problem      Hypotension











- History of Present Illness


Initial Comments - Free Text/Narative: 





77 yo male with metastatic bladder cancer who presents to the ED from [place] 

with concerns of hypotension and fevers.  Patient was noted to have blood 

pressures in the 70s-80s systolic.  Patient complains of cough that is dry.  He 

denies any chest pain or shortness of breath.


  ** left shoulder


Pain Score (Numeric/FACES): 2





- Related Data


Allergies/Adverse Reactions: 


 Allergies











Allergy/AdvReac Type Severity Reaction Status Date / Time


 


No Known Allergies Allergy   Verified 06/04/19 11:54











Home Medications: 


 Home Meds





Triamterene/Hydrochlorothiazid [Triamterene-HCTZ 37.5-25 MG] 1 tab PO BID 06/04/

15 [History]


Fish Oil/Omega-3 Fatty Acids [Fish Oil 1,000 MG] 1 cap PO BID 05/18/17 [History]


Latanoprost [Xalatan 0.005% Ophth Soln] 1 drop EYEBOTH BEDTIME 05/18/17 [History

]


Levothyroxine 25 mcg PO ACBREAKFAST 12/08/18 [History]


Loratadine [Claritin] 10 mg PO QAM 04/09/19 [History]


Meclizine [Antivert] 50 mg PO BID 04/09/19 [History]


Metoprolol Tartrate 50 mg PO BID 04/09/19 [History]


Ondansetron [Zofran] 8 mg PO Q8H PRN 04/09/19 [History]


Potassium Chloride [Klor-Con 10] 20 meq PO QAM 04/09/19 [History]


Prochlorperazine Maleate [Compazine] 10 mg PO TID PRN 04/09/19 [History]


oxyCODONE 5 mg PO Q8H PRN #15 tab 04/11/19 [Rx]


Acetaminophen [Tylenol] 325 mg PO Q4H PRN 06/04/19 [History]


Cyclobenzaprine HCl 5 mg PO TID 06/04/19 [History]


Fluticasone Propionate [Flonase] 2 spray NASBOTH QAM 06/04/19 [History]


Magnesium Hydroxide [Milk of Magnesia] 30 ml PO Q24H PRN 06/04/19 [History]


Menthol [Biofreeze] 1 applic TOP BEDTIME 06/04/19 [History]


Montelukast [Singulair] 10 mg PO BEDTIME 06/04/19 [History]


Ranitidine HCl [Heartburn Relief] 150 mg PO DAILY PRN 06/04/19 [History]











Past Medical History





- Past Health History


Medical/Surgical History: Denies Medical/Surgical History


HEENT History: Reports: Allergic Rhinitis, Cataract, Glaucoma, Other (See Below)


Other HEENT History: wears glasses. Nasal congestion.


Cardiovascular History: Reports: Hypertension


Respiratory History: Reports: Sleep Apnea


Other Respiratory History: does not use CPAP


Gastrointestinal History: Reports: GERD


Genitourinary History: Reports: Other (See Below)


Other Genitourinary History: hx of bladder tumor


Musculoskeletal History: Reports: Arthritis, Back Pain, Chronic, Fracture, 

Osteoarthritis, Other (See Below)


Other Musculoskeletal History: Unable to Ambulate, Uses wheelchair & lift


Neurological History: Reports: None


Psychiatric History: Reports: None


Endocrine/Metabolic History: Reports: Obesity/BMI 30+


Hematologic History: Reports: None


Immunologic History: Reports: None


Oncologic (Cancer) History: Reports: Bladder


Other Oncologic History: Malignant neoplasm of bladder


Dermatologic History: Reports: None





- Infectious Disease History


Infectious Disease History: Reports: Chicken Pox, Measles, Mumps





- Past Surgical History


Head Surgeries/Procedures: Reports: None


Cardiovascular Surgical History: Reports: None


Respiratory Surgical History: Reports: None


GI Surgical History: Reports: Appendectomy


Male  Surgical History: Reports: TURBT-Transurethral Resection of Bladder 

Tumor


Endocrine Surgical History: Reports: None


Neurological Surgical History: Reports: None


Dermatological Surgical History: Reports: None





Social & Family History





- Family History


Family Medical History: Noncontributory


HEENT: Reports: None





- Tobacco Use


Smoking Status *Q: Former Smoker


Used Tobacco, but Quit: Yes


Month/Year Tobacco Last Used: quit 1969





- Caffeine Use


Caffeine Use: Reports: Coffee


Caffeine Use Comment: 1-2 cups of coffee per day, 1 coke per day





- Recreational Drug Use


Recreational Drug Use: No





H&P Review of Systems





- Review of Systems:


Review Of Systems: ROS reveals no pertinent complaints other than HPI.


Gastrointestinal: Reports: Bloody Stool





Exam





- Exam


Exam: See Below





- Vital Signs


Vital Signs: 


 Last Vital Signs











Temp  36.7 C   06/04/19 20:00


 


Pulse  110 H  06/04/19 20:00


 


Resp  19   06/04/19 20:00


 


BP  95/66   06/04/19 20:00


 


Pulse Ox  96   06/04/19 20:00











Weight: 82 kg





- Exam


General: Alert, Oriented


HEENT: Mucosa Moist & Pink


Lungs: Clear to Auscultation, Normal Respiratory Effort


Cardiovascular: Regular Rate, Regular Rhythm


GI/Abdominal Exam: Soft, Non-Tender, No Distention


Extremities: Non-Tender, No Pedal Edema


Skin: Warm, Dry, Intact


Neurological: No: Focal Deficit





- Patient Data


Lab Results Last 24 hrs: 


 Laboratory Results - last 24 hr











  06/04/19 06/04/19 06/04/19 Range/Units





  11:57 12:23 12:23 


 


WBC  5.36    (4.0-11.0)  K/uL


 


RBC  4.36 L    (4.50-5.90)  M/uL


 


Hgb  11.9 L    (13.0-17.0)  g/dL


 


Hct  35.1 L    (38.0-50.0)  %


 


MCV  80.5    (80.0-98.0)  fL


 


MCH  27.3    (27.0-32.0)  pg


 


MCHC  33.9    (31.0-37.0)  g/dL


 


RDW Std Deviation  51.1    (28.0-62.0)  fl


 


RDW Coeff of Rachele  18 H    (11.0-15.0)  %


 


Plt Count  102 L    (150-400)  K/uL


 


MPV  9.30    (7.40-12.00)  fL


 


Add Manual Diff  YES    


 


Neutrophils % (Manual)  74    (48.0-80.0)  %


 


Band Neutrophils %  9    %


 


Lymphocytes % (Manual)  12 L    (16.0-40.0)  %


 


Monocytes % (Manual)  4    (0.0-15.0)  %


 


Myelocytes %  1    %


 


Nucleated RBC %  0.4    /100WBC


 


Absolute Seg Neuts  4.0    (1.4-5.7)  


 


Band Neutrophils #  0.5    


 


Lymphocytes # (Manual)  0.6    (0.6-2.4)  


 


Monocytes # (Manual)  0.2    (0.0-0.8)  


 


Absolute Myelocytes  0.1    


 


Nucleated RBCs #  0    K/uL


 


Platelet Estimate  DECREASED    


 


Lactate    2.2 H  (0.20-2.00)  mmol/L


 


Sodium   127 L   (136-148)  mmol/L


 


Potassium   3.4 L   (3.5-5.1)  mmol/L


 


Chloride   89 L   ()  mmol/L


 


Carbon Dioxide   31.2   (21.0-32.0)  mmol/L


 


BUN   25 H   (7.0-18.0)  mg/dL


 


Creatinine   0.7 L   (0.8-1.3)  mg/dL


 


Est Cr Clr Drug Dosing   85.57   mL/min


 


Estimated GFR (MDRD)   > 60.0   ml/min


 


Glucose   91   ()  mg/dL


 


Calcium   8.1 L   (8.5-10.1)  mg/dL


 


Total Bilirubin   1.4 H   (0.2-1.0)  mg/dL


 


AST   35   (15-37)  IU/L


 


ALT   35   (14-63)  IU/L


 


Alkaline Phosphatase   131 H   ()  U/L


 


Troponin I   0.066 H*   (0.000-0.056)  ng/mL


 


Total Protein   6.7   (6.4-8.2)  g/dL


 


Albumin   2.5 L   (3.4-5.0)  g/dL


 


Globulin   4.2 H   (2.6-4.0)  g/dL


 


Albumin/Globulin Ratio   0.6 L   (0.9-1.6)  


 


Urine Color     


 


Urine Appearance     


 


Urine pH     (5.0-8.0)  


 


Ur Specific Gravity     (1.001-1.035)  


 


Urine Protein     (NEGATIVE)  mg/dL


 


Urine Glucose (UA)     (NEGATIVE)  mg/dL


 


Urine Ketones     (NEGATIVE)  mg/dL


 


Urine Occult Blood     (NEGATIVE)  


 


Urine Nitrite     (NEGATIVE)  


 


Urine Bilirubin     (NEGATIVE)  


 


Urine Urobilinogen     (<2.0)  EU/dL


 


Ur Leukocyte Esterase     (NEGATIVE)  


 


Urine RBC     (0-2/HPF)  


 


Urine WBC     (0-5/HPF)  


 


Ur Epithelial Cells     (NONE-FEW)  


 


Urine Bacteria     (NEGATIVE)  


 


Urine Mucus     (NONE-MOD)  














  06/04/19 06/04/19 06/04/19 Range/Units





  13:15 18:30 18:30 


 


WBC     (4.0-11.0)  K/uL


 


RBC     (4.50-5.90)  M/uL


 


Hgb     (13.0-17.0)  g/dL


 


Hct     (38.0-50.0)  %


 


MCV     (80.0-98.0)  fL


 


MCH     (27.0-32.0)  pg


 


MCHC     (31.0-37.0)  g/dL


 


RDW Std Deviation     (28.0-62.0)  fl


 


RDW Coeff of Rachele     (11.0-15.0)  %


 


Plt Count     (150-400)  K/uL


 


MPV     (7.40-12.00)  fL


 


Add Manual Diff     


 


Neutrophils % (Manual)     (48.0-80.0)  %


 


Band Neutrophils %     %


 


Lymphocytes % (Manual)     (16.0-40.0)  %


 


Monocytes % (Manual)     (0.0-15.0)  %


 


Myelocytes %     %


 


Nucleated RBC %     /100WBC


 


Absolute Seg Neuts     (1.4-5.7)  


 


Band Neutrophils #     


 


Lymphocytes # (Manual)     (0.6-2.4)  


 


Monocytes # (Manual)     (0.0-0.8)  


 


Absolute Myelocytes     


 


Nucleated RBCs #     K/uL


 


Platelet Estimate     


 


Lactate   1.5   (0.20-2.00)  mmol/L


 


Sodium     (136-148)  mmol/L


 


Potassium     (3.5-5.1)  mmol/L


 


Chloride     ()  mmol/L


 


Carbon Dioxide     (21.0-32.0)  mmol/L


 


BUN     (7.0-18.0)  mg/dL


 


Creatinine     (0.8-1.3)  mg/dL


 


Est Cr Clr Drug Dosing     mL/min


 


Estimated GFR (MDRD)     ml/min


 


Glucose     ()  mg/dL


 


Calcium     (8.5-10.1)  mg/dL


 


Total Bilirubin     (0.2-1.0)  mg/dL


 


AST     (15-37)  IU/L


 


ALT     (14-63)  IU/L


 


Alkaline Phosphatase     ()  U/L


 


Troponin I    0.060 H*  (0.000-0.056)  ng/mL


 


Total Protein     (6.4-8.2)  g/dL


 


Albumin     (3.4-5.0)  g/dL


 


Globulin     (2.6-4.0)  g/dL


 


Albumin/Globulin Ratio     (0.9-1.6)  


 


Urine Color  YELLOW    


 


Urine Appearance  SLT CLOUDY    


 


Urine pH  6.0    (5.0-8.0)  


 


Ur Specific Gravity  1.015    (1.001-1.035)  


 


Urine Protein  NEGATIVE    (NEGATIVE)  mg/dL


 


Urine Glucose (UA)  NEGATIVE    (NEGATIVE)  mg/dL


 


Urine Ketones  NEGATIVE    (NEGATIVE)  mg/dL


 


Urine Occult Blood  LARGE H    (NEGATIVE)  


 


Urine Nitrite  NEGATIVE    (NEGATIVE)  


 


Urine Bilirubin  NEGATIVE    (NEGATIVE)  


 


Urine Urobilinogen  2.0 H    (<2.0)  EU/dL


 


Ur Leukocyte Esterase  NEGATIVE    (NEGATIVE)  


 


Urine RBC      (0-2/HPF)  


 


Urine WBC  2-4    (0-5/HPF)  


 


Ur Epithelial Cells  OCCASIONAL    (NONE-FEW)  


 


Urine Bacteria  FEW    (NEGATIVE)  


 


Urine Mucus  LIGHT    (NONE-MOD)  











Result Diagrams: 


 06/05/19 05:25





 06/05/19 05:25


Pablo Results Last 24 hrs: 


 Microbiology











 06/04/19 12:41 Anaerobic Blood Culture - Final





 Blood - Venous - Lab Draw 


 


 06/04/19 12:23 Anaerobic Blood Culture - Final





 Blood - Venous 











Problem List Initiated/Reviewed/Updated: Yes


Orders Last 24hrs: 


 Active Orders 24 hr











 Category Date Time Status


 


 Admission Status [Patient Status] [ADT] Stat ADT  06/04/19 16:35 Active


 


 Antiembolic Devices [RC] PER UNIT ROUTINE Care  06/04/19 22:20 Active


 


 EKG Documentation Completion [RC] STAT Care  06/04/19 12:46 Active


 


 Oxygen Therapy [RC] ASDIRECTED Care  06/04/19 18:06 Active


 


 Oxygen Therapy [RC] PRN Care  06/04/19 22:19 Active


 


 RT Aerosol Therapy [RC] ASDIRECTED Care  06/04/19 18:05 Active


 


 VTE/DVT Education [RC] PER UNIT ROUTINE Care  06/04/19 22:19 Active


 


 Vital Signs [RC] Q4H Care  06/04/19 22:19 Active


 


 Regular Diet [DIET] Diet  06/04/19 Dinner Active


 


 BASIC METABOLIC PANEL,BMP [CHEM] Routine Lab  06/05/19 05:00 Ordered


 


 CBC WITH AUTO DIFF [HEME] Routine Lab  06/05/19 05:00 Ordered


 


 CULTURE BLOOD [BC] Stat Lab  06/04/19 12:23 Results


 


 CULTURE BLOOD [BC] Stat Lab  06/04/19 12:41 Results


 


 CULTURE URINE [RM] Routine Lab  06/04/19 13:15 Received


 


 TROPONIN I [CHEM] Q6H Lab  06/05/19 00:00 Ordered


 


 VANCOMYCIN TROUGH [CHEM] Routine Lab  06/06/19 10:00 Ordered


 


 Albuterol/Ipratropium [DuoNeb 3.0-0.5 MG/3 ML] Med  06/04/19 18:04 Active





 3 ml NEB Q4HRRT PRN   


 


 Azithromycin [Zithromax] 500 mg Med  06/04/19 22:00 Active





 Sodium Chloride 0.9% [Normal Saline] 250 ml   





 IV Q24H   


 


 Ondansetron [Zofran] Med  06/04/19 18:15 Active





 8 mg PO Q8H PRN   


 


 Pharmacy to Dose - Vancomycin Med  06/04/19 23:00 Pending





 1 dose .XX ASDIRECTED   


 


 Piperacillin/Tazobactam [Piperacil-Tazobact] 3.375 gm Med  06/04/19 19:00 

Active





 Sodium Chloride 0.9% [Normal Saline] 50 ml   





 IV Q6H   


 


 Sodium Chloride 0.9% [Normal Saline] 1,000 ml Med  06/04/19 18:15 Active





 IV ASDIRECTED   


 


 Vancomycin [Vancocin] 1 gm Med  06/04/19 23:00 Active





 Sodium Chloride 0.9% [Normal Saline] 250 ml   





 IV Q12H   


 


 oxyCODONE Med  06/04/19 18:15 Active





 5 mg PO Q8H PRN   


 


 Blood Culture x2 Reflex Set [OM.PC] Stat Oth  06/04/19 12:15 Ordered


 


 Sequential Compression Device [OM.PC] Per Unit Routine Oth  06/04/19 22:19 

Ordered








 Medication Orders





Albuterol/Ipratropium (Duoneb 3.0-0.5 Mg/3 Ml)  3 ml NEB Q4HRRT PRN


   PRN Reason: SOB/wheezing


   Last Admin: 06/04/19 18:27  Dose: 3 ml


Sodium Chloride (Normal Saline)  1,000 mls @ 125 mls/hr IV ASDIRECTED PRISCILLA


   Last Admin: 06/04/19 18:17  Dose: 125 mls/hr


Piperacillin Sod/Tazobactam (Sod 3.375 gm/ Sodium Chloride)  50 mls @ 100 mls/

hr IV Q6H PRISCILLA


   Last Admin: 06/04/19 18:44  Dose: 100 mls/hr


Vancomycin HCl 1 gm/ Sodium (Chloride)  250 mls @ 166 mls/hr IV Q12H PRISCILLA


Azithromycin 500 mg/ Sodium (Chloride)  250 mls @ 250 mls/hr IV Q24H PRISCILLA


Ondansetron HCl (Zofran)  8 mg PO Q8H PRN


   PRN Reason: Nausea/Vomiting


Oxycodone HCl (Oxycodone)  5 mg PO Q8H PRN


   PRN Reason: Pain


Vancomycin HCl (Pharmacy To Dose - Vancomycin)  1 dose .XX ASDIRECTED [facility] hospital








Assessment/Plan Comment:: 





77 yo male admitted for pneumonia.


Pneumonia: treating with broad spectrum antibiotics. Cultures ordered.  

Continue fluids, lactic acid has normalized


mildly elevated troponin: will continue to trend.


hyponatremia: likely dehydration and acute illness. will hold HCTZ


hypokalemia: replacing

## 2019-06-04 NOTE — EDM.PDOC
ED HPI GENERAL MEDICAL PROBLEM





- General


Chief Complaint: Cardiovascular Problem


Stated Complaint: LOW BLOOD PRESSURE


Time Seen by Provider: 06/04/19 12:03


Source of Information: Reports: Patient





- History of Present Illness


INITIAL COMMENTS - FREE TEXT/NARRATIVE: 





HISTORY AND PHYSICAL:


History of present illness:


pt  presents from [place] history of cancer, fever hypotension and tachycardia, 

recently on Levaquin for UTI








Review of systems: 


As per history of present illness and below otherwise all systems reviewed and 

negative.


Past medical history: 


As per history of present illness and as reviewed below otherwise 

noncontributory.


Surgical history: 


As per history of present illness and as reviewed below otherwise 

noncontributory.


Social history: 


No reported history of drug or alcohol abuse.


Family history: 


As per history of present illness and as reviewed below otherwise 

noncontributory.


Physical exam:


HEENT: Atraumatic, normocephalic, pupils reactive, negative for conjunctival 

pallor or scleral icterus, mucous membranes moist, throat clear, neck supple, 

nontender, trachea midline.


Lungs: Clear to auscultation, breath sounds equal bilaterally, chest nontender.


Heart: S1S2, regular, negative for clicks, rubs, or JVD.


Abdomen: Soft, nondistended, nontender. Negative for masses or 

hepatosplenomegaly. Negative for costovertebral tenderness.


Pelvis: Stable nontender.


Genitourinary: Deferred.


Rectal: Deferred.


Extremities: Atraumatic, negative for cords or calf pain. Neurovascular 

unremarkable.


Neuro: Awake, alert, oriented. Cranial nerves II through XII unremarkable. 

Cerebellum unremarkable. Motor and sensory unremarkable throughout. Exam 

nonfocal.


Diagnostics:


[CBC CMP UA troponin cultures 2 lactic: Blood


EKG


Chest 1 view


]


Therapeutics:


[ normal saline


Vancomycin


Zosyn


]


Impression: 


[ systemic inflammatory response syndrome 


UTI


[place] history of baseline]


Definitive disposition and diagnosis as appropriate pending reevaluation and 

review of above.


  ** left shoulder


Pain Score (Numeric/FACES): 2





- Related Data


 Allergies











Allergy/AdvReac Type Severity Reaction Status Date / Time


 


No Known Allergies Allergy   Verified 06/04/19 11:54











Home Meds: 


 Home Meds





Triamterene/Hydrochlorothiazid [Triamterene-HCTZ 37.5-25 MG] 1 tab PO BID 06/04/

15 [History]


Fish Oil/Omega-3 Fatty Acids [Fish Oil 1,000 MG] 1 cap PO BID 05/18/17 [History]


Latanoprost [Xalatan 0.005% Ophth Soln] 1 drop EYEBOTH BEDTIME 05/18/17 [History

]


Levothyroxine 25 mcg PO ACBREAKFAST 12/08/18 [History]


Loratadine [Claritin] 10 mg PO QAM 04/09/19 [History]


Meclizine [Antivert] 50 mg PO BID 04/09/19 [History]


Metoprolol Tartrate 50 mg PO BID 04/09/19 [History]


Ondansetron [Zofran] 8 mg PO Q8H PRN 04/09/19 [History]


Potassium Chloride [Klor-Con 10] 20 meq PO QAM 04/09/19 [History]


Prochlorperazine Maleate [Compazine] 10 mg PO TID PRN 04/09/19 [History]


oxyCODONE 5 mg PO Q8H PRN #15 tab 04/11/19 [Rx]


Acetaminophen [Tylenol] 325 mg PO Q4H PRN 06/04/19 [History]


Cyclobenzaprine HCl 5 mg PO TID 06/04/19 [History]


Fluticasone Propionate [Flonase] 2 spray NASBOTH QAM 06/04/19 [History]


Magnesium Hydroxide [Milk of Magnesia] 30 ml PO Q24H PRN 06/04/19 [History]


Menthol [Biofreeze] 1 applic TOP BEDTIME 06/04/19 [History]


Montelukast [Singulair] 10 mg PO BEDTIME 06/04/19 [History]


Ranitidine HCl [Heartburn Relief] 150 mg PO DAILY PRN 06/04/19 [History]











Past Medical History





- Past Health History


Medical/Surgical History: Denies Medical/Surgical History


HEENT History: Reports: Allergic Rhinitis, Cataract, Glaucoma, Other (See Below)


Other HEENT History: wears glasses. Nasal congestion.


Cardiovascular History: Reports: Hypertension


Respiratory History: Reports: Sleep Apnea


Other Respiratory History: does not use CPAP


Gastrointestinal History: Reports: GERD


Genitourinary History: Reports: Other (See Below)


Other Genitourinary History: hx of bladder tumor


Musculoskeletal History: Reports: Arthritis, Back Pain, Chronic, Fracture, 

Osteoarthritis, Other (See Below)


Other Musculoskeletal History: Unable to Ambulate, Uses wheelchair & lift


Neurological History: Reports: None


Psychiatric History: Reports: None


Endocrine/Metabolic History: Reports: Obesity/BMI 30+


Hematologic History: Reports: None


Immunologic History: Reports: None


Oncologic (Cancer) History: Reports: Bladder


Other Oncologic History: Malignant neoplasm of bladder


Dermatologic History: Reports: None





- Infectious Disease History


Infectious Disease History: Reports: Chicken Pox, Measles, Mumps





- Past Surgical History


Head Surgeries/Procedures: Reports: None


Cardiovascular Surgical History: Reports: None


Respiratory Surgical History: Reports: None


GI Surgical History: Reports: Appendectomy


Male  Surgical History: Reports: TURBT-Transurethral Resection of Bladder 

Tumor


Endocrine Surgical History: Reports: None


Neurological Surgical History: Reports: None


Dermatological Surgical History: Reports: None





Social & Family History





- Family History


Family Medical History: Noncontributory


HEENT: Reports: None





- Tobacco Use


Smoking Status *Q: Former Smoker


Used Tobacco, but Quit: Yes


Month/Year Tobacco Last Used: quit 1969





- Caffeine Use


Caffeine Use: Reports: Coffee


Caffeine Use Comment: 1-2 cups of coffee per day, 1 coke per day





- Recreational Drug Use


Recreational Drug Use: No





ED ROS GENERAL





- Review of Systems


Review Of Systems: See Below





ED EXAM, GENERAL





- Physical Exam


Exam: See Below





Course





- Vital Signs


Last Recorded V/S: 


 Last Vital Signs











Temp  98.9 F   06/04/19 13:32


 


Pulse  110 H  06/04/19 16:02


 


Resp  17   06/04/19 16:02


 


BP  107/67   06/04/19 16:02


 


Pulse Ox  94 L  06/04/19 16:02














- Orders/Labs/Meds


Orders: 


 Active Orders 24 hr











 Category Date Time Status


 


 EKG Documentation Completion [RC] STAT Care  06/04/19 12:46 Active


 


 CULTURE BLOOD [BC] Stat Lab  06/04/19 12:23 Results


 


 CULTURE BLOOD [BC] Stat Lab  06/04/19 12:41 Results


 


 Sodium Chloride 0.9% [Normal Saline] 1,000 ml Med  06/04/19 12:15 Active





 IV STAT   


 


 Blood Culture x2 Reflex Set [OM.PC] Stat Oth  06/04/19 12:15 Ordered








 Medication Orders





Sodium Chloride (Normal Saline)  1,000 mls @ 125 mls/hr IV STAT PRISCILLA


   Last Admin: 06/04/19 12:30  Dose: 125 mls/hr








Labs: 


 Laboratory Tests











  06/04/19 06/04/19 06/04/19 Range/Units





  11:57 12:23 12:23 


 


WBC  5.36    (4.0-11.0)  K/uL


 


RBC  4.36 L    (4.50-5.90)  M/uL


 


Hgb  11.9 L    (13.0-17.0)  g/dL


 


Hct  35.1 L    (38.0-50.0)  %


 


MCV  80.5    (80.0-98.0)  fL


 


MCH  27.3    (27.0-32.0)  pg


 


MCHC  33.9    (31.0-37.0)  g/dL


 


RDW Std Deviation  51.1    (28.0-62.0)  fl


 


RDW Coeff of Rachele  18 H    (11.0-15.0)  %


 


Plt Count  102 L    (150-400)  K/uL


 


MPV  9.30    (7.40-12.00)  fL


 


Add Manual Diff  YES    


 


Neutrophils % (Manual)  74    (48.0-80.0)  %


 


Band Neutrophils %  9    %


 


Lymphocytes % (Manual)  12 L    (16.0-40.0)  %


 


Monocytes % (Manual)  4    (0.0-15.0)  %


 


Myelocytes %  1    %


 


Nucleated RBC %  0.4    /100WBC


 


Absolute Seg Neuts  4.0    (1.4-5.7)  


 


Band Neutrophils #  0.5    


 


Lymphocytes # (Manual)  0.6    (0.6-2.4)  


 


Monocytes # (Manual)  0.2    (0.0-0.8)  


 


Absolute Myelocytes  0.1    


 


Nucleated RBCs #  0    K/uL


 


Platelet Estimate  DECREASED    


 


Lactate    2.2 H  (0.20-2.00)  mmol/L


 


Sodium   127 L   (136-148)  mmol/L


 


Potassium   3.4 L   (3.5-5.1)  mmol/L


 


Chloride   89 L   ()  mmol/L


 


Carbon Dioxide   31.2   (21.0-32.0)  mmol/L


 


BUN   25 H   (7.0-18.0)  mg/dL


 


Creatinine   0.7 L   (0.8-1.3)  mg/dL


 


Est Cr Clr Drug Dosing   85.57   mL/min


 


Estimated GFR (MDRD)   > 60.0   ml/min


 


Glucose   91   ()  mg/dL


 


Calcium   8.1 L   (8.5-10.1)  mg/dL


 


Total Bilirubin   1.4 H   (0.2-1.0)  mg/dL


 


AST   35   (15-37)  IU/L


 


ALT   35   (14-63)  IU/L


 


Alkaline Phosphatase   131 H   ()  U/L


 


Troponin I   0.066 H*   (0.000-0.056)  ng/mL


 


Total Protein   6.7   (6.4-8.2)  g/dL


 


Albumin   2.5 L   (3.4-5.0)  g/dL


 


Globulin   4.2 H   (2.6-4.0)  g/dL


 


Albumin/Globulin Ratio   0.6 L   (0.9-1.6)  


 


Urine Color     


 


Urine Appearance     


 


Urine pH     (5.0-8.0)  


 


Ur Specific Gravity     (1.001-1.035)  


 


Urine Protein     (NEGATIVE)  mg/dL


 


Urine Glucose (UA)     (NEGATIVE)  mg/dL


 


Urine Ketones     (NEGATIVE)  mg/dL


 


Urine Occult Blood     (NEGATIVE)  


 


Urine Nitrite     (NEGATIVE)  


 


Urine Bilirubin     (NEGATIVE)  


 


Urine Urobilinogen     (<2.0)  EU/dL


 


Ur Leukocyte Esterase     (NEGATIVE)  


 


Urine RBC     (0-2/HPF)  


 


Urine WBC     (0-5/HPF)  


 


Ur Epithelial Cells     (NONE-FEW)  


 


Urine Bacteria     (NEGATIVE)  


 


Urine Mucus     (NONE-MOD)  














  06/04/19 Range/Units





  13:15 


 


WBC   (4.0-11.0)  K/uL


 


RBC   (4.50-5.90)  M/uL


 


Hgb   (13.0-17.0)  g/dL


 


Hct   (38.0-50.0)  %


 


MCV   (80.0-98.0)  fL


 


MCH   (27.0-32.0)  pg


 


MCHC   (31.0-37.0)  g/dL


 


RDW Std Deviation   (28.0-62.0)  fl


 


RDW Coeff of Rachele   (11.0-15.0)  %


 


Plt Count   (150-400)  K/uL


 


MPV   (7.40-12.00)  fL


 


Add Manual Diff   


 


Neutrophils % (Manual)   (48.0-80.0)  %


 


Band Neutrophils %   %


 


Lymphocytes % (Manual)   (16.0-40.0)  %


 


Monocytes % (Manual)   (0.0-15.0)  %


 


Myelocytes %   %


 


Nucleated RBC %   /100WBC


 


Absolute Seg Neuts   (1.4-5.7)  


 


Band Neutrophils #   


 


Lymphocytes # (Manual)   (0.6-2.4)  


 


Monocytes # (Manual)   (0.0-0.8)  


 


Absolute Myelocytes   


 


Nucleated RBCs #   K/uL


 


Platelet Estimate   


 


Lactate   (0.20-2.00)  mmol/L


 


Sodium   (136-148)  mmol/L


 


Potassium   (3.5-5.1)  mmol/L


 


Chloride   ()  mmol/L


 


Carbon Dioxide   (21.0-32.0)  mmol/L


 


BUN   (7.0-18.0)  mg/dL


 


Creatinine   (0.8-1.3)  mg/dL


 


Est Cr Clr Drug Dosing   mL/min


 


Estimated GFR (MDRD)   ml/min


 


Glucose   ()  mg/dL


 


Calcium   (8.5-10.1)  mg/dL


 


Total Bilirubin   (0.2-1.0)  mg/dL


 


AST   (15-37)  IU/L


 


ALT   (14-63)  IU/L


 


Alkaline Phosphatase   ()  U/L


 


Troponin I   (0.000-0.056)  ng/mL


 


Total Protein   (6.4-8.2)  g/dL


 


Albumin   (3.4-5.0)  g/dL


 


Globulin   (2.6-4.0)  g/dL


 


Albumin/Globulin Ratio   (0.9-1.6)  


 


Urine Color  YELLOW  


 


Urine Appearance  SLT CLOUDY  


 


Urine pH  6.0  (5.0-8.0)  


 


Ur Specific Gravity  1.015  (1.001-1.035)  


 


Urine Protein  NEGATIVE  (NEGATIVE)  mg/dL


 


Urine Glucose (UA)  NEGATIVE  (NEGATIVE)  mg/dL


 


Urine Ketones  NEGATIVE  (NEGATIVE)  mg/dL


 


Urine Occult Blood  LARGE H  (NEGATIVE)  


 


Urine Nitrite  NEGATIVE  (NEGATIVE)  


 


Urine Bilirubin  NEGATIVE  (NEGATIVE)  


 


Urine Urobilinogen  2.0 H  (<2.0)  EU/dL


 


Ur Leukocyte Esterase  NEGATIVE  (NEGATIVE)  


 


Urine RBC    (0-2/HPF)  


 


Urine WBC  2-4  (0-5/HPF)  


 


Ur Epithelial Cells  OCCASIONAL  (NONE-FEW)  


 


Urine Bacteria  FEW  (NEGATIVE)  


 


Urine Mucus  LIGHT  (NONE-MOD)  











Meds: 


Medications











Generic Name Dose Route Start Last Admin





  Trade Name Freq  PRN Reason Stop Dose Admin


 


Sodium Chloride  1,000 mls @ 125 mls/hr  06/04/19 12:15  06/04/19 12:30





  Normal Saline  IV   125 mls/hr





  STAT PRISCILLA   Administration





     





     





     





     














Discontinued Medications














Generic Name Dose Route Start Last Admin





  Trade Name Freq  PRN Reason Stop Dose Admin


 


Piperacillin Sod/Tazobactam  50 mls @ 100 mls/hr  06/04/19 12:15  06/04/19 12:30





  Sod 3.375 gm/ Sodium Chloride  IV  06/04/19 12:44  100 mls/hr





  ONETIME ONE   Administration





     





     





     





     


 


Vancomycin HCl 1 gm/ Sodium  250 mls @ 250 mls/hr  06/04/19 12:15  06/04/19 13:

19





  Chloride  IV  06/04/19 13:14  Not Given





  ONETIME ONE   





     





     





     





     


 


Vancomycin HCl 1 gm/ Sodium  250 mls @ 250 mls/hr  06/04/19 13:00  06/04/19 13:

44





  Chloride  IV  06/04/19 13:59  250 mls/hr





  ONETIME ONE   Administration





     





     





     





     














Departure





- Departure


Time of Disposition: 16:20


Disposition: Refer to Observation


Condition: Poor


Clinical Impression: 


 Hypotension, Systemic inflammatory response syndrome, History of metastatic 

neoplastic disease, History of bladder cancer





Urinary tract infection


Qualifiers:


 Urinary tract infection type: site unspecified Hematuria presence: with 

hematuria Qualified Code(s): N39.0 - Urinary tract infection, site not specified





Referrals: 


PCP,None [Primary Care Provider] - 


Forms:  ED Department Discharge





- My Orders


Last 24 Hours: 


My Active Orders





06/04/19 12:15


Sodium Chloride 0.9% [Normal Saline] 1,000 ml IV STAT 


Blood Culture x2 Reflex Set [OM.PC] Stat 





06/04/19 12:23


CULTURE BLOOD [BC] Stat 





06/04/19 12:41


CULTURE BLOOD [BC] Stat 





06/04/19 12:46


EKG Documentation Completion [RC] STAT 














- Assessment/Plan


Last 24 Hours: 


My Active Orders





06/04/19 12:15


Sodium Chloride 0.9% [Normal Saline] 1,000 ml IV STAT 


Blood Culture x2 Reflex Set [OM.PC] Stat 





06/04/19 12:23


CULTURE BLOOD [BC] Stat 





06/04/19 12:41


CULTURE BLOOD [BC] Stat 





06/04/19 12:46


EKG Documentation Completion [RC] STAT

## 2019-06-04 NOTE — CR
INDICATION: Dyspnea.



HISTORY:



Dyspnea.



COMPARISON:



04/29/2019.



TECHNIQUE:



Chest one-view portable.



FINDINGS:



There is a calcified mass present overlapping the left clavicular 

diaphysis, which is unchanged, measuring greater than 10 cm in long axis 

dimension. There is no acute airspace disease or pneumothorax. The central 

airway is normal. Right IJ Port-A-Cath, with its tip in the SVC. No 

pneumothorax.



IMPRESSION:



Stable exam when compared with 04/29/2019.



Dictated by Brandon Metzger MD @ 6/4/2019 2:01:27 PM



Dictated by: Brandon Metzger MD @ 06/04/2019 14:01:40



(Electronically Signed)

## 2019-06-05 LAB
CHLORIDE SERPL-SCNC: 95 MMOL/L (ref 98–107)
SODIUM SERPL-SCNC: 131 MMOL/L (ref 136–148)

## 2019-06-05 RX ADMIN — POTASSIUM CHLORIDE SCH MEQ: 1500 TABLET, EXTENDED RELEASE ORAL at 08:46

## 2019-06-05 RX ADMIN — POTASSIUM CHLORIDE SCH MEQ: 1500 TABLET, EXTENDED RELEASE ORAL at 12:49

## 2019-06-05 NOTE — PCM.PN
- General Info


Date of Service: 06/05/19


Admission Dx/Problem (Free Text): 


 Admission Diagnosis/Problem





Admission Diagnosis/Problem      Hypotension








Subjective Update: 





Feeling improved today, cough is better. No abdominal pain. No chest pain. Has 

L shoulder pain but this is chronic from mass and radiation treatments.


Functional Status: Reports: Pain Controlled, Tolerating Diet, Ambulating, 

Urinating





- Review of Systems


HEENT: Reports: No Symptoms.  Denies: Contact Lenses, Visual Changes


Pulmonary: Reports: No Symptoms.  Denies: Shortness of Breath


Cardiovascular: Reports: No Symptoms.  Denies: Chest Pain


Gastrointestinal: Reports: No Symptoms.  Denies: Abdominal Pain, Nausea, 

Vomiting


Genitourinary: Reports: No Symptoms.  Denies: Dysuria, Frequency, Burning


Musculoskeletal: Reports: Shoulder Pain (L shoulder)


Skin: Reports: No Symptoms


Neurological: Reports: No Symptoms


Psychiatric: Reports: No Symptoms





- Patient Data


Vitals - Most Recent: 


 Last Vital Signs











Temp  97.9 F   06/05/19 08:00


 


Pulse  100   06/05/19 08:00


 


Resp  18   06/05/19 08:00


 


BP  96/52 L  06/05/19 08:00


 


Pulse Ox  93 L  06/05/19 08:00











Weight - Most Recent: 82 kg


I&O - Last 24 Hours: 


 Intake & Output











 06/04/19 06/05/19 06/05/19





 22:59 06:59 14:59


 


Intake Total  950 


 


Balance  950 











Lab Results Last 24 Hours: 


 Laboratory Results - last 24 hr











  06/04/19 06/04/19 06/04/19 Range/Units





  11:57 12:23 12:23 


 


WBC  5.36    (4.0-11.0)  K/uL


 


RBC  4.36 L    (4.50-5.90)  M/uL


 


Hgb  11.9 L    (13.0-17.0)  g/dL


 


Hct  35.1 L    (38.0-50.0)  %


 


MCV  80.5    (80.0-98.0)  fL


 


MCH  27.3    (27.0-32.0)  pg


 


MCHC  33.9    (31.0-37.0)  g/dL


 


RDW Std Deviation  51.1    (28.0-62.0)  fl


 


RDW Coeff of Rachele  18 H    (11.0-15.0)  %


 


Plt Count  102 L    (150-400)  K/uL


 


MPV  9.30    (7.40-12.00)  fL


 


Add Manual Diff  YES    


 


Neutrophils % (Manual)  74    (48.0-80.0)  %


 


Band Neutrophils %  9    %


 


Lymphocytes % (Manual)  12 L    (16.0-40.0)  %


 


Monocytes % (Manual)  4    (0.0-15.0)  %


 


Eosinophils % (Manual)     (0.0-7.0)  %


 


Myelocytes %  1    %


 


Nucleated RBC %  0.4    /100WBC


 


Absolute Seg Neuts  4.0    (1.4-5.7)  


 


Band Neutrophils #  0.5    


 


Lymphocytes # (Manual)  0.6    (0.6-2.4)  


 


Monocytes # (Manual)  0.2    (0.0-0.8)  


 


Eosinophils # (Manual)     (0.0-0.7)  


 


Absolute Myelocytes  0.1    


 


Nucleated RBCs #  0    K/uL


 


Platelet Estimate  DECREASED    


 


Lactate    2.2 H  (0.20-2.00)  mmol/L


 


Sodium   127 L   (136-148)  mmol/L


 


Potassium   3.4 L   (3.5-5.1)  mmol/L


 


Chloride   89 L   ()  mmol/L


 


Carbon Dioxide   31.2   (21.0-32.0)  mmol/L


 


BUN   25 H   (7.0-18.0)  mg/dL


 


Creatinine   0.7 L   (0.8-1.3)  mg/dL


 


Est Cr Clr Drug Dosing   85.57   mL/min


 


Estimated GFR (MDRD)   > 60.0   ml/min


 


Glucose   91   ()  mg/dL


 


Calcium   8.1 L   (8.5-10.1)  mg/dL


 


Magnesium     (1.8-2.4)  mg/dL


 


Total Bilirubin   1.4 H   (0.2-1.0)  mg/dL


 


AST   35   (15-37)  IU/L


 


ALT   35   (14-63)  IU/L


 


Alkaline Phosphatase   131 H   ()  U/L


 


Troponin I   0.066 H*   (0.000-0.056)  ng/mL


 


Total Protein   6.7   (6.4-8.2)  g/dL


 


Albumin   2.5 L   (3.4-5.0)  g/dL


 


Globulin   4.2 H   (2.6-4.0)  g/dL


 


Albumin/Globulin Ratio   0.6 L   (0.9-1.6)  


 


Urine Color     


 


Urine Appearance     


 


Urine pH     (5.0-8.0)  


 


Ur Specific Gravity     (1.001-1.035)  


 


Urine Protein     (NEGATIVE)  mg/dL


 


Urine Glucose (UA)     (NEGATIVE)  mg/dL


 


Urine Ketones     (NEGATIVE)  mg/dL


 


Urine Occult Blood     (NEGATIVE)  


 


Urine Nitrite     (NEGATIVE)  


 


Urine Bilirubin     (NEGATIVE)  


 


Urine Urobilinogen     (<2.0)  EU/dL


 


Ur Leukocyte Esterase     (NEGATIVE)  


 


Urine RBC     (0-2/HPF)  


 


Urine WBC     (0-5/HPF)  


 


Ur Epithelial Cells     (NONE-FEW)  


 


Urine Bacteria     (NEGATIVE)  


 


Urine Mucus     (NONE-MOD)  














  06/04/19 06/04/19 06/04/19 Range/Units





  13:15 18:30 18:30 


 


WBC     (4.0-11.0)  K/uL


 


RBC     (4.50-5.90)  M/uL


 


Hgb     (13.0-17.0)  g/dL


 


Hct     (38.0-50.0)  %


 


MCV     (80.0-98.0)  fL


 


MCH     (27.0-32.0)  pg


 


MCHC     (31.0-37.0)  g/dL


 


RDW Std Deviation     (28.0-62.0)  fl


 


RDW Coeff of Rachele     (11.0-15.0)  %


 


Plt Count     (150-400)  K/uL


 


MPV     (7.40-12.00)  fL


 


Add Manual Diff     


 


Neutrophils % (Manual)     (48.0-80.0)  %


 


Band Neutrophils %     %


 


Lymphocytes % (Manual)     (16.0-40.0)  %


 


Monocytes % (Manual)     (0.0-15.0)  %


 


Eosinophils % (Manual)     (0.0-7.0)  %


 


Myelocytes %     %


 


Nucleated RBC %     /100WBC


 


Absolute Seg Neuts     (1.4-5.7)  


 


Band Neutrophils #     


 


Lymphocytes # (Manual)     (0.6-2.4)  


 


Monocytes # (Manual)     (0.0-0.8)  


 


Eosinophils # (Manual)     (0.0-0.7)  


 


Absolute Myelocytes     


 


Nucleated RBCs #     K/uL


 


Platelet Estimate     


 


Lactate   1.5   (0.20-2.00)  mmol/L


 


Sodium     (136-148)  mmol/L


 


Potassium     (3.5-5.1)  mmol/L


 


Chloride     ()  mmol/L


 


Carbon Dioxide     (21.0-32.0)  mmol/L


 


BUN     (7.0-18.0)  mg/dL


 


Creatinine     (0.8-1.3)  mg/dL


 


Est Cr Clr Drug Dosing     mL/min


 


Estimated GFR (MDRD)     ml/min


 


Glucose     ()  mg/dL


 


Calcium     (8.5-10.1)  mg/dL


 


Magnesium     (1.8-2.4)  mg/dL


 


Total Bilirubin     (0.2-1.0)  mg/dL


 


AST     (15-37)  IU/L


 


ALT     (14-63)  IU/L


 


Alkaline Phosphatase     ()  U/L


 


Troponin I    0.060 H*  (0.000-0.056)  ng/mL


 


Total Protein     (6.4-8.2)  g/dL


 


Albumin     (3.4-5.0)  g/dL


 


Globulin     (2.6-4.0)  g/dL


 


Albumin/Globulin Ratio     (0.9-1.6)  


 


Urine Color  YELLOW    


 


Urine Appearance  SLT CLOUDY    


 


Urine pH  6.0    (5.0-8.0)  


 


Ur Specific Gravity  1.015    (1.001-1.035)  


 


Urine Protein  NEGATIVE    (NEGATIVE)  mg/dL


 


Urine Glucose (UA)  NEGATIVE    (NEGATIVE)  mg/dL


 


Urine Ketones  NEGATIVE    (NEGATIVE)  mg/dL


 


Urine Occult Blood  LARGE H    (NEGATIVE)  


 


Urine Nitrite  NEGATIVE    (NEGATIVE)  


 


Urine Bilirubin  NEGATIVE    (NEGATIVE)  


 


Urine Urobilinogen  2.0 H    (<2.0)  EU/dL


 


Ur Leukocyte Esterase  NEGATIVE    (NEGATIVE)  


 


Urine RBC      (0-2/HPF)  


 


Urine WBC  2-4    (0-5/HPF)  


 


Ur Epithelial Cells  OCCASIONAL    (NONE-FEW)  


 


Urine Bacteria  FEW    (NEGATIVE)  


 


Urine Mucus  LIGHT    (NONE-MOD)  














  06/05/19 06/05/19 06/05/19 Range/Units





  00:15 05:25 05:25 


 


WBC   4.17   (4.0-11.0)  K/uL


 


RBC   3.34 L   (4.50-5.90)  M/uL


 


Hgb   9.0 L   (13.0-17.0)  g/dL


 


Hct   27.0 L   (38.0-50.0)  %


 


MCV   80.8   (80.0-98.0)  fL


 


MCH   26.9 L   (27.0-32.0)  pg


 


MCHC   33.3   (31.0-37.0)  g/dL


 


RDW Std Deviation   51.1   (28.0-62.0)  fl


 


RDW Coeff of Rachele   18 H   (11.0-15.0)  %


 


Plt Count   87 L   (150-400)  K/uL


 


MPV   8.60   (7.40-12.00)  fL


 


Add Manual Diff   YES   


 


Neutrophils % (Manual)   80   (48.0-80.0)  %


 


Band Neutrophils %   7   %


 


Lymphocytes % (Manual)   10 L   (16.0-40.0)  %


 


Monocytes % (Manual)   2   (0.0-15.0)  %


 


Eosinophils % (Manual)   1   (0.0-7.0)  %


 


Myelocytes %     %


 


Nucleated RBC %   0.0   /100WBC


 


Absolute Seg Neuts   3.3   (1.4-5.7)  


 


Band Neutrophils #   0.3   


 


Lymphocytes # (Manual)   0.4 L   (0.6-2.4)  


 


Monocytes # (Manual)   0.1   (0.0-0.8)  


 


Eosinophils # (Manual)   0.0   (0.0-0.7)  


 


Absolute Myelocytes     


 


Nucleated RBCs #   0   K/uL


 


Platelet Estimate     


 


Lactate     (0.20-2.00)  mmol/L


 


Sodium    131 L  (136-148)  mmol/L


 


Potassium    3.1 L  (3.5-5.1)  mmol/L


 


Chloride    95 L  ()  mmol/L


 


Carbon Dioxide    29.3  (21.0-32.0)  mmol/L


 


BUN    20 H  (7.0-18.0)  mg/dL


 


Creatinine    0.6 L  (0.8-1.3)  mg/dL


 


Est Cr Clr Drug Dosing    99.83  mL/min


 


Estimated GFR (MDRD)    > 60.0  ml/min


 


Glucose    101  ()  mg/dL


 


Calcium    7.2 L  (8.5-10.1)  mg/dL


 


Magnesium     (1.8-2.4)  mg/dL


 


Total Bilirubin     (0.2-1.0)  mg/dL


 


AST     (15-37)  IU/L


 


ALT     (14-63)  IU/L


 


Alkaline Phosphatase     ()  U/L


 


Troponin I  0.073 H*    (0.000-0.056)  ng/mL


 


Total Protein     (6.4-8.2)  g/dL


 


Albumin     (3.4-5.0)  g/dL


 


Globulin     (2.6-4.0)  g/dL


 


Albumin/Globulin Ratio     (0.9-1.6)  


 


Urine Color     


 


Urine Appearance     


 


Urine pH     (5.0-8.0)  


 


Ur Specific Gravity     (1.001-1.035)  


 


Urine Protein     (NEGATIVE)  mg/dL


 


Urine Glucose (UA)     (NEGATIVE)  mg/dL


 


Urine Ketones     (NEGATIVE)  mg/dL


 


Urine Occult Blood     (NEGATIVE)  


 


Urine Nitrite     (NEGATIVE)  


 


Urine Bilirubin     (NEGATIVE)  


 


Urine Urobilinogen     (<2.0)  EU/dL


 


Ur Leukocyte Esterase     (NEGATIVE)  


 


Urine RBC     (0-2/HPF)  


 


Urine WBC     (0-5/HPF)  


 


Ur Epithelial Cells     (NONE-FEW)  


 


Urine Bacteria     (NEGATIVE)  


 


Urine Mucus     (NONE-MOD)  














  06/05/19 Range/Units





  05:25 


 


WBC   (4.0-11.0)  K/uL


 


RBC   (4.50-5.90)  M/uL


 


Hgb   (13.0-17.0)  g/dL


 


Hct   (38.0-50.0)  %


 


MCV   (80.0-98.0)  fL


 


MCH   (27.0-32.0)  pg


 


MCHC   (31.0-37.0)  g/dL


 


RDW Std Deviation   (28.0-62.0)  fl


 


RDW Coeff of Rachele   (11.0-15.0)  %


 


Plt Count   (150-400)  K/uL


 


MPV   (7.40-12.00)  fL


 


Add Manual Diff   


 


Neutrophils % (Manual)   (48.0-80.0)  %


 


Band Neutrophils %   %


 


Lymphocytes % (Manual)   (16.0-40.0)  %


 


Monocytes % (Manual)   (0.0-15.0)  %


 


Eosinophils % (Manual)   (0.0-7.0)  %


 


Myelocytes %   %


 


Nucleated RBC %   /100WBC


 


Absolute Seg Neuts   (1.4-5.7)  


 


Band Neutrophils #   


 


Lymphocytes # (Manual)   (0.6-2.4)  


 


Monocytes # (Manual)   (0.0-0.8)  


 


Eosinophils # (Manual)   (0.0-0.7)  


 


Absolute Myelocytes   


 


Nucleated RBCs #   K/uL


 


Platelet Estimate   


 


Lactate   (0.20-2.00)  mmol/L


 


Sodium   (136-148)  mmol/L


 


Potassium   (3.5-5.1)  mmol/L


 


Chloride   ()  mmol/L


 


Carbon Dioxide   (21.0-32.0)  mmol/L


 


BUN   (7.0-18.0)  mg/dL


 


Creatinine   (0.8-1.3)  mg/dL


 


Est Cr Clr Drug Dosing   mL/min


 


Estimated GFR (MDRD)   ml/min


 


Glucose   ()  mg/dL


 


Calcium   (8.5-10.1)  mg/dL


 


Magnesium  2.1  (1.8-2.4)  mg/dL


 


Total Bilirubin   (0.2-1.0)  mg/dL


 


AST   (15-37)  IU/L


 


ALT   (14-63)  IU/L


 


Alkaline Phosphatase   ()  U/L


 


Troponin I   (0.000-0.056)  ng/mL


 


Total Protein   (6.4-8.2)  g/dL


 


Albumin   (3.4-5.0)  g/dL


 


Globulin   (2.6-4.0)  g/dL


 


Albumin/Globulin Ratio   (0.9-1.6)  


 


Urine Color   


 


Urine Appearance   


 


Urine pH   (5.0-8.0)  


 


Ur Specific Gravity   (1.001-1.035)  


 


Urine Protein   (NEGATIVE)  mg/dL


 


Urine Glucose (UA)   (NEGATIVE)  mg/dL


 


Urine Ketones   (NEGATIVE)  mg/dL


 


Urine Occult Blood   (NEGATIVE)  


 


Urine Nitrite   (NEGATIVE)  


 


Urine Bilirubin   (NEGATIVE)  


 


Urine Urobilinogen   (<2.0)  EU/dL


 


Ur Leukocyte Esterase   (NEGATIVE)  


 


Urine RBC   (0-2/HPF)  


 


Urine WBC   (0-5/HPF)  


 


Ur Epithelial Cells   (NONE-FEW)  


 


Urine Bacteria   (NEGATIVE)  


 


Urine Mucus   (NONE-MOD)  











Pablo Results Last 24 Hours: 


 Microbiology











 06/04/19 12:41 Anaerobic Blood Culture - Final





 Blood - Venous - Lab Draw 


 


 06/04/19 12:23 Anaerobic Blood Culture - Final





 Blood - Venous 











Med Orders - Current: 


 Current Medications





Albuterol/Ipratropium (Duoneb 3.0-0.5 Mg/3 Ml)  3 ml NEB Q4HRRT PRN


   PRN Reason: SOB/wheezing


   Last Admin: 06/04/19 18:27 Dose:  3 ml


Sodium Chloride (Normal Saline)  1,000 mls @ 125 mls/hr IV ASDIRECTED [facility]LL[facility]


   Last Admin: 06/05/19 06:35 Dose:  125 mls/hr


Piperacillin Sod/Tazobactam (Sod 3.375 gm/ Sodium Chloride)  50 mls @ 100 mls/

hr IV Q6H [facility]


   Last Admin: 06/05/19 06:26 Dose:  100 mls/hr


Azithromycin 500 mg/ Sodium (Chloride)  250 mls @ 250 mls/hr IV Q24H [facility]


   Last Admin: 06/04/19 22:58 Dose:  250 mls/hr


Vancomycin HCl 1 gm/ Sodium (Chloride)  250 mls @ 166 mls/hr IV Q12H [facility]


Ondansetron HCl (Zofran)  8 mg PO Q8H PRN


   PRN Reason: Nausea/Vomiting


Oxycodone HCl (Oxycodone)  5 mg PO Q8H PRN


   PRN Reason: Pain


Potassium Chloride (Klor-Con M20)  40 meq PO BID@0830,1200 [facility]


   Stop: 06/05/19 12:01


   Last Admin: 06/05/19 08:46 Dose:  40 meq


Vancomycin HCl (Pharmacy To Dose - Vancomycin)  1 dose .XX ASDIRECTED [facility]





Discontinued Medications





Aspirin (Aspirin)  325 mg PO ONETIME ONE


   Stop: 06/05/19 01:06


   Last Admin: 06/05/19 01:39 Dose:  325 mg


Piperacillin Sod/Tazobactam (Sod 3.375 gm/ Sodium Chloride)  50 mls @ 100 mls/

hr IV ONETIME ONE


   Stop: 06/04/19 12:44


   Last Admin: 06/04/19 12:30 Dose:  100 mls/hr


Sodium Chloride (Normal Saline)  1,000 mls @ 125 mls/hr IV STAT [facility]


   Last Admin: 06/04/19 12:30 Dose:  125 mls/hr


Vancomycin HCl 1 gm/ Sodium (Chloride)  250 mls @ 250 mls/hr IV ONETIME ONE


   Stop: 06/04/19 13:14


   Last Admin: 06/04/19 13:19 Dose:  Not Given


Vancomycin HCl 1 gm/ Sodium (Chloride)  250 mls @ 250 mls/hr IV ONETIME ONE


   Stop: 06/04/19 13:59


   Last Admin: 06/04/19 13:44 Dose:  250 mls/hr


Vancomycin HCl 1 gm/ Sodium (Chloride)  250 mls @ 166 mls/hr IV Q12H [facility]


   Last Admin: 06/05/19 00:35 Dose:  166 mls/hr


Sodium Chloride (Normal Saline)  500 mls @ 999 mls/hr IV STAT [facility]


   Stop: 06/05/19 08:46


Potassium Chloride (Klor-Con M20)  40 meq PO ONETIME ONE


   Stop: 06/04/19 18:12


   Last Admin: 06/04/19 18:42 Dose:  40 meq











- Exam


General: Alert, Oriented, Cooperative, No Acute Distress


Lungs: Clear to Auscultation, Normal Respiratory Effort


Cardiovascular: Regular Rate, Regular Rhythm


GI/Abdominal Exam: Normal Bowel Sounds, Soft, Non-Tender


Extremities: Normal Inspection, Normal Range of Motion, Non-Tender, No Pedal 

Edema


Neurological: No New Focal Deficit


Psy/Mental Status: Alert, Normal Affect, Normal Mood





- Problem List & Annotations


(1) Healthcare-associated pneumonia


SNOMED Code(s): 343536852, 195888424


   Code(s): J18.9 - PNEUMONIA, UNSPECIFIED ORGANISM   Status: Acute   Current 

Visit: Yes   





(2) Hypotension


SNOMED Code(s): 31671910


   Code(s): I95.9 - HYPOTENSION, UNSPECIFIED   Status: Acute   Current Visit: 

Yes   





(3) Systemic inflammatory response syndrome


SNOMED Code(s): 257830091


   Code(s): R65.10 - SIRS OF NON-INFECTIOUS ORIGIN W/O ACUTE ORGAN DYSFUNCTION 

  Status: Acute   Current Visit: Yes   





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


SNOMED Code(s): 24911699, 086595299


   Code(s): C67.9 - MALIGNANT NEOPLASM OF BLADDER, UNSPECIFIED; C79.51 - 

SECONDARY MALIGNANT NEOPLASM OF BONE   Status: Chronic   Current Visit: No   





(5) HTN (hypertension)


SNOMED Code(s): 03737423


   Code(s): I10 - ESSENTIAL (PRIMARY) HYPERTENSION   Status: Chronic   Current 

Visit: No   


Qualifiers: 


   Hypertension type: essential hypertension   Qualified Code(s): I10 - 

Essential (primary) hypertension   





(6) DALI (obstructive sleep apnea)


SNOMED Code(s): 21455824


   Code(s): G47.33 - OBSTRUCTIVE SLEEP APNEA (ADULT) (PEDIATRIC)   Status: 

Chronic   Current Visit: No   





- Problem List Review


Problem List Initiated/Reviewed/Updated: Yes





- My Orders


Last 24 Hours: 


My Active Orders





06/05/19 08:30


Potassium Chloride [Klor-Con M20]   40 meq PO BID@0830,1200 














- Plan


Plan:: 





79 yo male admitted for pneumonia.





1. Healthcare associated Pneumonia: treating with broad spectrum antibiotics 

with Zosyn, Vancomycin, and Azithromycin. Cultures pending. Continue fluids.





2. Mildly elevated troponin: will continue to trend. Likely ischemic demand 

from hypotension





3. Hyponatremia: Improving with IVFs, will continue hold HCTZ





4. Hypokalemia: replacing with PO KCL today, will recheck. Magnesium WNL.





VTE prophylaxis: SCDs, reports possible blood in stool will hold off on Heparin 

and order Occult stool. 





Dispo: 2-3 days pending improvement

## 2019-06-06 LAB
CHLORIDE SERPL-SCNC: 100 MMOL/L (ref 98–107)
SODIUM SERPL-SCNC: 134 MMOL/L (ref 136–148)

## 2019-06-06 RX ADMIN — POTASSIUM CHLORIDE SCH MEQ: 750 TABLET, FILM COATED, EXTENDED RELEASE ORAL at 17:58

## 2019-06-06 RX ADMIN — POTASSIUM CHLORIDE SCH MEQ: 750 TABLET, FILM COATED, EXTENDED RELEASE ORAL at 08:46

## 2019-06-06 RX ADMIN — POTASSIUM CHLORIDE SCH MEQ: 750 TABLET, FILM COATED, EXTENDED RELEASE ORAL at 13:20

## 2019-06-06 NOTE — PCM.PN
- General Info


Date of Service: 06/06/19


Admission Dx/Problem (Free Text): 


 Admission Diagnosis/Problem





Admission Diagnosis/Problem      Hypotension








Subjective Update: 





Feeling weak today. Having a cough, congested but unable to cough up 

completely. No chest pain. Feels SOB is ok. L shoulder pain still there, but is 

chronic. Oxycodone helps.


Functional Status: Reports: Pain Controlled, Tolerating Diet, Ambulating, 

Urinating





- Review of Systems


General: Reports: Weakness, Malaise


HEENT: Reports: No Symptoms.  Denies: Headaches, Sore Throat, Visual Changes


Pulmonary: Reports: Cough.  Denies: Shortness of Breath, Sputum, Wheezing


Cardiovascular: Reports: No Symptoms.  Denies: Chest Pain


Gastrointestinal: Reports: No Symptoms.  Denies: Abdominal Pain, Nausea, 

Vomiting


Genitourinary: Reports: No Symptoms.  Denies: Dysuria, Frequency


Musculoskeletal: Reports: No Symptoms


Skin: Reports: No Symptoms


Neurological: Reports: No Symptoms


Psychiatric: Reports: No Symptoms





- Patient Data


Vitals - Most Recent: 


 Last Vital Signs











Temp  100.5 F   06/06/19 08:00


 


Pulse  106 H  06/06/19 08:00


 


Resp  18   06/06/19 08:00


 


BP  97/51 L  06/06/19 08:00


 


Pulse Ox  93 L  06/06/19 08:00











Weight - Most Recent: 82.8 kg


I&O - Last 24 Hours: 


 Intake & Output











 06/05/19 06/06/19 06/06/19





 22:59 06:59 14:59


 


Intake Total 2326 2250 


 


Output Total 0  


 


Balance 2326 2250 











Lab Results Last 24 Hours: 


 Laboratory Results - last 24 hr











  06/05/19 06/06/19 06/06/19 Range/Units





  05:25 05:04 05:04 


 


WBC   3.73 L   (4.0-11.0)  K/uL


 


RBC   2.85 L   (4.50-5.90)  M/uL


 


Hgb   7.7 L   (13.0-17.0)  g/dL


 


Hct   23.2 L   (38.0-50.0)  %


 


MCV   81.4   (80.0-98.0)  fL


 


MCH   27.0   (27.0-32.0)  pg


 


MCHC   33.2   (31.0-37.0)  g/dL


 


RDW Std Deviation   52.5   (28.0-62.0)  fl


 


RDW Coeff of Rachele   18 H   (11.0-15.0)  %


 


Plt Count   75 L   (150-400)  K/uL


 


MPV   8.90   (7.40-12.00)  fL


 


Add Manual Diff   YES   


 


Neutrophils % (Manual)   68   (48.0-80.0)  %


 


Band Neutrophils %   12   %


 


Lymphocytes % (Manual)   15 L   (16.0-40.0)  %


 


Monocytes % (Manual)   4   (0.0-15.0)  %


 


Eosinophils % (Manual)   1   (0.0-7.0)  %


 


Nucleated RBC %   0.0   /100WBC


 


Absolute Seg Neuts   2.5   (1.4-5.7)  


 


Band Neutrophils #   0.4   


 


Lymphocytes # (Manual)   0.6   (0.6-2.4)  


 


Monocytes # (Manual)   0.1   (0.0-0.8)  


 


Eosinophils # (Manual)   0.0   (0.0-0.7)  


 


Nucleated RBCs #   0   K/uL


 


Sodium    134 L  (136-148)  mmol/L


 


Potassium    2.9 L  (3.5-5.1)  mmol/L


 


Chloride    100  ()  mmol/L


 


Carbon Dioxide    26.8  (21.0-32.0)  mmol/L


 


BUN    11  (7.0-18.0)  mg/dL


 


Creatinine    0.4 L  (0.8-1.3)  mg/dL


 


Est Cr Clr Drug Dosing    149.75  mL/min


 


Estimated GFR (MDRD)    > 60.0  ml/min


 


Glucose    90  ()  mg/dL


 


Calcium    6.5 L  (8.5-10.1)  mg/dL


 


Magnesium  2.1    (1.8-2.4)  mg/dL


 


Troponin I    0.107 H*  (0.000-0.056)  ng/mL











Pablo Results Last 24 Hours: 


 Microbiology











 06/04/19 13:15 Urine Culture - Final





 Urine, Catheterized    MIXED JOSE <1000 CFU/ML


 


 06/04/19 12:41 Aerobic Blood Culture - Preliminary





 Blood - Venous - Lab Draw    NO GROWTH AFTER 1 DAY





 Anaerobic Blood Culture - Final


 


 06/04/19 12:23 Aerobic Blood Culture - Preliminary





 Blood - Venous    NO GROWTH AFTER 1 DAY





 Anaerobic Blood Culture - Final











Med Orders - Current: 


 Current Medications





Albuterol/Ipratropium (Duoneb 3.0-0.5 Mg/3 Ml)  3 ml NEB Q4HRRT PRN


   PRN Reason: SOB/wheezing


   Last Admin: 06/05/19 13:42 Dose:  3 ml


Sodium Chloride (Normal Saline)  1,000 mls @ 125 mls/hr IV ASDIRECTED [facility]


   Last Admin: 06/06/19 04:23 Dose:  125 mls/hr


Piperacillin Sod/Tazobactam (Sod 3.375 gm/ Sodium Chloride)  50 mls @ 100 mls/

hr IV Q6H [facility]


   Last Admin: 06/06/19 06:28 Dose:  100 mls/hr


Azithromycin 500 mg/ Sodium (Chloride)  250 mls @ 250 mls/hr IV Q24H [facility]


   Last Admin: 06/05/19 21:45 Dose:  250 mls/hr


Vancomycin HCl 1 gm/ Sodium (Chloride)  250 mls @ 166 mls/hr IV Q12H [facility]


   Last Admin: 06/06/19 01:26 Dose:  166 mls/hr


Ondansetron HCl (Zofran)  8 mg PO Q8H PRN


   PRN Reason: Nausea/Vomiting


Oxycodone HCl (Oxycodone)  5 mg PO Q8H PRN


   PRN Reason: Pain


   Last Admin: 06/05/19 16:08 Dose:  5 mg





Discontinued Medications





Aspirin (Aspirin)  325 mg PO ONETIME ONE


   Stop: 06/05/19 01:06


   Last Admin: 06/05/19 01:39 Dose:  325 mg


Piperacillin Sod/Tazobactam (Sod 3.375 gm/ Sodium Chloride)  50 mls @ 100 mls/

hr IV ONETIME ONE


   Stop: 06/04/19 12:44


   Last Admin: 06/04/19 12:30 Dose:  100 mls/hr


Sodium Chloride (Normal Saline)  1,000 mls @ 125 mls/hr IV STAT [facility]


   Last Admin: 06/04/19 12:30 Dose:  125 mls/hr


Vancomycin HCl 1 gm/ Sodium (Chloride)  250 mls @ 250 mls/hr IV ONETIME ONE


   Stop: 06/04/19 13:14


   Last Admin: 06/04/19 13:19 Dose:  Not Given


Vancomycin HCl 1 gm/ Sodium (Chloride)  250 mls @ 250 mls/hr IV ONETIME ONE


   Stop: 06/04/19 13:59


   Last Admin: 06/04/19 13:44 Dose:  250 mls/hr


Vancomycin HCl 1 gm/ Sodium (Chloride)  250 mls @ 166 mls/hr IV Q12H [facility]


   Last Admin: 06/05/19 00:35 Dose:  166 mls/hr


Sodium Chloride (Normal Saline)  500 mls @ 999 mls/hr IV STAT [facility]


   Stop: 06/05/19 08:46


Potassium Chloride (Klor-Con M20)  40 meq PO ONETIME ONE


   Stop: 06/04/19 18:12


   Last Admin: 06/04/19 18:42 Dose:  40 meq


Potassium Chloride (Klor-Con M20)  40 meq PO BID@0830,1200 [facility]


   Stop: 06/05/19 12:01


   Last Admin: 06/05/19 12:49 Dose:  40 meq


Vancomycin HCl (Pharmacy To Dose - Vancomycin)  1 dose .XX ASDIRECTED [facility]











- Exam


Quality Assessment: Supplemental Oxygen


General: Alert, Oriented, Cooperative, No Acute Distress


Lungs: Decreased Breath Sounds, Crackles


Cardiovascular: Regular Rhythm, Tachycardia


GI/Abdominal Exam: Normal Bowel Sounds, Soft, Non-Tender


Extremities: Normal Inspection, Normal Range of Motion, Non-Tender, No Pedal 

Edema


Wound/Incisions: Healing Well


Neurological: No New Focal Deficit


Psy/Mental Status: Alert, Normal Affect, Normal Mood





- Problem List & Annotations


(1) Healthcare-associated pneumonia


SNOMED Code(s): 970593130, 695529663


   Code(s): J18.9 - PNEUMONIA, UNSPECIFIED ORGANISM   Status: Acute   Current 

Visit: Yes   





(2) Hypotension


SNOMED Code(s): 64139103


   Code(s): I95.9 - HYPOTENSION, UNSPECIFIED   Status: Acute   Current Visit: 

Yes   





(3) Systemic inflammatory response syndrome


SNOMED Code(s): 175043315


   Code(s): R65.10 - SIRS OF NON-INFECTIOUS ORIGIN W/O ACUTE ORGAN DYSFUNCTION 

  Status: Acute   Current Visit: Yes   





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


SNOMED Code(s): 01342950, 099828667


   Code(s): C67.9 - MALIGNANT NEOPLASM OF BLADDER, UNSPECIFIED; C79.51 - 

SECONDARY MALIGNANT NEOPLASM OF BONE   Status: Chronic   Current Visit: No   





(5) HTN (hypertension)


SNOMED Code(s): 19153227


   Code(s): I10 - ESSENTIAL (PRIMARY) HYPERTENSION   Status: Chronic   Current 

Visit: No   


Qualifiers: 


   Hypertension type: essential hypertension   Qualified Code(s): I10 - 

Essential (primary) hypertension   





(6) DALI (obstructive sleep apnea)


SNOMED Code(s): 84600004


   Code(s): G47.33 - OBSTRUCTIVE SLEEP APNEA (ADULT) (PEDIATRIC)   Status: 

Chronic   Current Visit: No   





- Problem List Review


Problem List Initiated/Reviewed/Updated: Yes





- My Orders


Last 24 Hours: 


My Active Orders





06/05/19 13:56


Hemoccult [OCCULT BLOOD DIAGNOSTIC] [OP] Routine 





06/06/19 08:11


ALBUMIN [CHEM] Routine 


MAGNESIUM [CHEM] Routine 





06/07/19 05:11


BMP [BASIC METABOLIC PANEL,BMP] [CHEM] AM 


CBC WITH AUTO DIFF [HEME] AM 





06/08/19 05:11


BMP [BASIC METABOLIC PANEL,BMP] [CHEM] AM 


CBC WITH AUTO DIFF [HEME] AM 





06/09/19 05:11


BMP [BASIC METABOLIC PANEL,BMP] [CHEM] AM 


CBC WITH AUTO DIFF [HEME] AM 














- Plan


Plan:: 





77 yo male admitted for pneumonia.





1. Healthcare associated Pneumonia: Continue treating with broad spectrum 

antibiotics with Zosyn, Vancomycin, and Azithromycin. Cultures pending. 





2. Mildly elevated troponin: will continue to trend. Likely ischemic demand 

from hypotension





3. Hyponatremia: Stable. will continue hold HCTZ





4. Hypokalemia: replacing with PO KCL today, will recheck. Magnesium WNL.





5. Anemia: Feeling weak today, Hgb 7.7, likely some dilutional, but will 

transfuse with 2 units PRBCs. Stop IVFs today. Lasix 20 mg IV in between units. 





VTE prophylaxis: SCDs, reports possible blood in stool will hold off on Heparin 

and order Occult stool. 





Dispo: 2-3 days pending improvement

## 2019-06-07 LAB
CHLORIDE SERPL-SCNC: 100 MMOL/L (ref 98–107)
SODIUM SERPL-SCNC: 135 MMOL/L (ref 136–148)

## 2019-06-07 PROCEDURE — 30233N1 TRANSFUSION OF NONAUTOLOGOUS RED BLOOD CELLS INTO PERIPHERAL VEIN, PERCUTANEOUS APPROACH: ICD-10-PCS | Performed by: INTERNAL MEDICINE

## 2019-06-07 RX ADMIN — POTASSIUM CHLORIDE SCH MEQ: 750 TABLET, FILM COATED, EXTENDED RELEASE ORAL at 09:37

## 2019-06-07 RX ADMIN — POTASSIUM CHLORIDE SCH MEQ: 750 TABLET, FILM COATED, EXTENDED RELEASE ORAL at 12:05

## 2019-06-07 RX ADMIN — FLUTICASONE PROPIONATE SCH SPRAY: 50 SPRAY, METERED NASAL at 09:39

## 2019-06-07 RX ADMIN — POTASSIUM CHLORIDE SCH MEQ: 750 TABLET, FILM COATED, EXTENDED RELEASE ORAL at 16:58

## 2019-06-07 NOTE — CR
HISTORY:



Crackles.



TECHNIQUE:



One view of the chest.



COMPARISON:



06/04/2019.



FINDINGS:



Right-sided Port-A-Cath with catheter tip terminating within the SVC. 

Cardiac size within normal limits. Shallow breath. No consolidation or 

acute pulmonary edema. Possible right midlung zone nodule laterally. This 

may been present previously. There is no pneumothorax or pleural effusion. 

Peripherally calcified or ossified process in the left periclavicular 

region is unchanged but of uncertain etiology.



IMPRESSION:



1. Low lung volumes which exaggerate the bronchovascular markings.



2. No consolidation or acute pulmonary edema. 



3. Question right midlung zone nodule as before.



Dictated by Abdullahi Tate MD @ 6/7/2019 10:39:34 AM



Dictated by: Abdullahi Tate MD @ 06/07/2019 10:39:37



(Electronically Signed)

## 2019-06-07 NOTE — PCM.PN
Addendum entered and electronically signed by Kay Little NP  06/07/19 14:34

: 





Evaluated by ST, noted to cough with everything. ST recommends NPO status. 

[place] declines this and wants to eat.  I discussed this with Nadeem, nephew. 

They would like to have modified barium swallow evaluation to help decide next 

step. Hospice or at least comfort measure were recommended due to his current 

prognosis and likely progression of bladder cancer. MBS is tentatively set up 

for Monday at 2:30 pm with radiology and ST. 





Original Note:








- General Info


Date of Service: 06/07/19


Admission Dx/Problem (Free Text): 


 Admission Diagnosis/Problem





Admission Diagnosis/Problem      Hypotension








Subjective Update: 





Reports feeling better today. Reports cough is still there. No chest pain. L 

Shoulder pain is intermittent, but chronic from radiation. Hoping to get up and 

ambulating. Reports he got up by himself overnight and ambulated. 


Functional Status: Reports: Pain Controlled, Tolerating Diet, Urinating.  Denies

: Ambulating





- Review of Systems


General: Reports: Weakness, Malaise.  Denies: Fever


HEENT: Reports: No Symptoms.  Denies: Headaches, Sore Throat


Pulmonary: Reports: No Symptoms, Cough.  Denies: Sputum


Cardiovascular: Reports: No Symptoms.  Denies: Chest Pain, Palpitations


Gastrointestinal: Reports: No Symptoms.  Denies: Abdominal Pain, Nausea, 

Vomiting


Genitourinary: Reports: No Symptoms


Musculoskeletal: Reports: Shoulder Pain (L, chronic)


Neurological: Reports: No Symptoms


Psychiatric: Reports: No Symptoms





- Patient Data


Vitals - Most Recent: 


 Last Vital Signs











Temp  99.1 F   06/07/19 03:57


 


Pulse  92   06/07/19 03:57


 


Resp  18   06/07/19 03:57


 


BP  87/45 L  06/07/19 03:57


 


Pulse Ox  93 L  06/07/19 03:57











Weight - Most Recent: 83.064 kg


I&O - Last 24 Hours: 


 Intake & Output











 06/06/19 06/07/19 06/07/19





 22:59 06:59 14:59


 


Intake Total 912 300 


 


Output Total 0  


 


Balance 912 300 











Lab Results Last 24 Hours: 


 Laboratory Results - last 24 hr











  06/06/19 06/07/19 06/07/19 Range/Units





  10:03 05:00 05:00 


 


WBC   4.39   (4.0-11.0)  K/uL


 


RBC   3.56 L   (4.50-5.90)  M/uL


 


Hgb   9.8 L   (13.0-17.0)  g/dL


 


Hct   29.5 L   (38.0-50.0)  %


 


MCV   82.9   (80.0-98.0)  fL


 


MCH   27.5   (27.0-32.0)  pg


 


MCHC   33.2   (31.0-37.0)  g/dL


 


RDW Std Deviation   52.3   (28.0-62.0)  fl


 


RDW Coeff of Rachele   17 H   (11.0-15.0)  %


 


Plt Count   75 L   (150-400)  K/uL


 


MPV   8.90   (7.40-12.00)  fL


 


Add Manual Diff   YES   


 


Neutrophils % (Manual)   70   (48.0-80.0)  %


 


Band Neutrophils %   14   %


 


Lymphocytes % (Manual)   12 L   (16.0-40.0)  %


 


Monocytes % (Manual)   4   (0.0-15.0)  %


 


Nucleated RBC %   0.6   /100WBC


 


Absolute Seg Neuts   3.1   (1.4-5.7)  


 


Band Neutrophils #   0.6   


 


Lymphocytes # (Manual)   0.5 L   (0.6-2.4)  


 


Monocytes # (Manual)   0.2   (0.0-0.8)  


 


Nucleated RBCs #   0   K/uL


 


Sodium    135 L  (136-148)  mmol/L


 


Potassium    3.1 L  (3.5-5.1)  mmol/L


 


Chloride    100  ()  mmol/L


 


Carbon Dioxide    30.0  (21.0-32.0)  mmol/L


 


BUN    13  (7.0-18.0)  mg/dL


 


Creatinine    0.4 L  (0.8-1.3)  mg/dL


 


Est Cr Clr Drug Dosing    149.75  mL/min


 


Estimated GFR (MDRD)    > 60.0  ml/min


 


Glucose    82  ()  mg/dL


 


Calcium    6.5 L  (8.5-10.1)  mg/dL


 


Magnesium     (1.8-2.4)  mg/dL


 


Troponin I    0.092 H*  (0.000-0.056)  ng/mL


 


Blood Type  O POSITIVE    


 


Antibody Screen  NEGATIVE    


 


Crossmatch  See Detail    














  06/07/19 Range/Units





  05:00 


 


WBC   (4.0-11.0)  K/uL


 


RBC   (4.50-5.90)  M/uL


 


Hgb   (13.0-17.0)  g/dL


 


Hct   (38.0-50.0)  %


 


MCV   (80.0-98.0)  fL


 


MCH   (27.0-32.0)  pg


 


MCHC   (31.0-37.0)  g/dL


 


RDW Std Deviation   (28.0-62.0)  fl


 


RDW Coeff of Rachele   (11.0-15.0)  %


 


Plt Count   (150-400)  K/uL


 


MPV   (7.40-12.00)  fL


 


Add Manual Diff   


 


Neutrophils % (Manual)   (48.0-80.0)  %


 


Band Neutrophils %   %


 


Lymphocytes % (Manual)   (16.0-40.0)  %


 


Monocytes % (Manual)   (0.0-15.0)  %


 


Nucleated RBC %   /100WBC


 


Absolute Seg Neuts   (1.4-5.7)  


 


Band Neutrophils #   


 


Lymphocytes # (Manual)   (0.6-2.4)  


 


Monocytes # (Manual)   (0.0-0.8)  


 


Nucleated RBCs #   K/uL


 


Sodium   (136-148)  mmol/L


 


Potassium   (3.5-5.1)  mmol/L


 


Chloride   ()  mmol/L


 


Carbon Dioxide   (21.0-32.0)  mmol/L


 


BUN   (7.0-18.0)  mg/dL


 


Creatinine   (0.8-1.3)  mg/dL


 


Est Cr Clr Drug Dosing   mL/min


 


Estimated GFR (MDRD)   ml/min


 


Glucose   ()  mg/dL


 


Calcium   (8.5-10.1)  mg/dL


 


Magnesium  1.7 L  (1.8-2.4)  mg/dL


 


Troponin I   (0.000-0.056)  ng/mL


 


Blood Type   


 


Antibody Screen   


 


Crossmatch   











Pablo Results Last 24 Hours: 


 Microbiology











 06/05/19 14:00 Stool Occult Blood (PABLO) - Final





 Stool / Feces    NEGATIVE OCCULT BLOOD





    REFERENCE RANGE: NEGATIVE


 


 06/04/19 12:41 Aerobic Blood Culture - Preliminary





 Blood - Venous - Lab Draw    NO GROWTH AFTER 2 DAYS





 Anaerobic Blood Culture - Final


 


 06/04/19 12:23 Aerobic Blood Culture - Preliminary





 Blood - Venous    NO GROWTH AFTER 2 DAYS





 Anaerobic Blood Culture - Final


 


 06/04/19 13:15 Urine Culture - Final





 Urine, Catheterized    MIXED JOSE <1000 CFU/ML











Med Orders - Current: 


 Current Medications





Albuterol/Ipratropium (Duoneb 3.0-0.5 Mg/3 Ml)  3 ml NEB Q4HRRT PRN


   PRN Reason: SOB/wheezing


   Last Admin: 06/06/19 15:19 Dose:  3 ml


Cyclobenzaprine HCl (Flexeril)  5 mg PO TID PRN


   PRN Reason: muscle spasms


Fluticasone Propionate (Flonase)  0 gm NASBOTH QAM [facility]


Piperacillin Sod/Tazobactam (Sod 3.375 gm/ Sodium Chloride)  50 mls @ 100 mls/

hr IV Q6H [facility]


   Last Admin: 06/07/19 06:30 Dose:  100 mls/hr


Vancomycin HCl 1 gm/ Sodium (Chloride)  250 mls @ 166 mls/hr IV Q12H [facility]


   Last Admin: 06/07/19 01:35 Dose:  166 mls/hr


Azithromycin 500 mg/ Sodium (Chloride)  250 mls @ 250 mls/hr IV Q24H [facility]


   Last Admin: 06/06/19 10:20 Dose:  250 mls/hr


Latanoprost (Xalatan 0.005% Ophth Soln)  0 ml EYEBOTH BEDTIME [facility]


   Last Admin: 06/06/19 23:04 Dose:  1 drop


Levothyroxine Sodium (Levothyroxine)  25 mcg PO ACBREAKFAST [facility]


   Last Admin: 06/07/19 06:32 Dose:  25 mcg


Loratadine (Claritin)  10 mg PO QAM [facility]


Magnesium Hydroxide (Milk Of Magnesia)  30 ml PO Q24H PRN


   PRN Reason: Constipation


Meclizine HCl (Antivert)  50 mg PO BID [facility]


   Last Admin: 06/06/19 20:51 Dose:  50 mg


Metoprolol Tartrate (Lopressor)  50 mg PO BID [facility]


   Last Admin: 06/06/19 20:51 Dose:  50 mg


Ondansetron HCl (Zofran)  8 mg PO Q8H PRN


   PRN Reason: Nausea/Vomiting


Oxycodone HCl (Oxycodone)  5 mg PO Q8H PRN


   PRN Reason: Pain


   Last Admin: 06/07/19 06:36 Dose:  5 mg


Pantoprazole Sodium (Protonix***)  40 mg PO ACBREAKFAST [facility]


Potassium Chloride (Klor-Con 10)  40 meq PO TID@0830,1200,1700 [facility]


   Stop: 06/07/19 17:01





Discontinued Medications





Aspirin (Aspirin)  325 mg PO ONETIME ONE


   Stop: 06/05/19 01:06


   Last Admin: 06/05/19 01:39 Dose:  325 mg


Furosemide (Lasix)  20 mg IVPUSH ONCALL ONE


   Stop: 06/06/19 10:32


   Last Admin: 06/06/19 15:11 Dose:  20 mg


Furosemide (Lasix) Confirm Administered Dose 20 mg .ROUTE .STK-MED ONE


   Stop: 06/06/19 15:03


   Last Admin: 06/06/19 15:07 Dose:  Not Given


Piperacillin Sod/Tazobactam (Sod 3.375 gm/ Sodium Chloride)  50 mls @ 100 mls/

hr IV ONETIME ONE


   Stop: 06/04/19 12:44


   Last Admin: 06/04/19 12:30 Dose:  100 mls/hr


Sodium Chloride (Normal Saline)  1,000 mls @ 125 mls/hr IV STAT [facility]


   Last Admin: 06/04/19 12:30 Dose:  125 mls/hr


Vancomycin HCl 1 gm/ Sodium (Chloride)  250 mls @ 250 mls/hr IV ONETIME ONE


   Stop: 06/04/19 13:14


   Last Admin: 06/04/19 13:19 Dose:  Not Given


Vancomycin HCl 1 gm/ Sodium (Chloride)  250 mls @ 250 mls/hr IV ONETIME ONE


   Stop: 06/04/19 13:59


   Last Admin: 06/04/19 13:44 Dose:  250 mls/hr


Sodium Chloride (Normal Saline)  1,000 mls @ 125 mls/hr IV ASDIRECTED [facility]


   Last Admin: 06/06/19 04:23 Dose:  125 mls/hr


Vancomycin HCl 1 gm/ Sodium (Chloride)  250 mls @ 166 mls/hr IV Q12H [facility]


   Last Admin: 06/05/19 00:35 Dose:  166 mls/hr


Azithromycin 500 mg/ Sodium (Chloride)  250 mls @ 250 mls/hr IV Q24H [facility]


   Last Admin: 06/05/19 21:45 Dose:  250 mls/hr


Sodium Chloride (Normal Saline)  500 mls @ 999 mls/hr IV STAT [facility]


   Stop: 06/05/19 08:46


Potassium Chloride (Klor-Con M20)  40 meq PO ONETIME ONE


   Stop: 06/04/19 18:12


   Last Admin: 06/04/19 18:42 Dose:  40 meq


Potassium Chloride (Klor-Con M20)  40 meq PO BID@0830,1200 [facility]


   Stop: 06/05/19 12:01


   Last Admin: 06/05/19 12:49 Dose:  40 meq


Potassium Chloride (Klor-Con 10)  40 meq PO TID@0900,1200,1700 [facility]


   Stop: 06/06/19 17:01


   Last Admin: 06/06/19 17:58 Dose:  40 meq


Vancomycin HCl (Pharmacy To Dose - Vancomycin)  1 dose .XX ASDIRECTED [facility]











- Exam


General: Alert, Oriented, Cooperative, No Acute Distress


Lungs: Rhonchi


Cardiovascular: Regular Rate, Regular Rhythm


GI/Abdominal Exam: Normal Bowel Sounds, Soft, Non-Tender, No Organomegaly, No 

Mass


Back Exam: Normal Inspection, Full Range of Motion


Extremities: Normal Inspection, Normal Range of Motion, Non-Tender, No Pedal 

Edema


Neurological: No New Focal Deficit


Psy/Mental Status: Alert, Normal Affect, Normal Mood





- Problem List & Annotations


(1) Healthcare-associated pneumonia


SNOMED Code(s): 564555759, 213762876


   Code(s): J18.9 - PNEUMONIA, UNSPECIFIED ORGANISM   Status: Acute   Current 

Visit: Yes   





(2) Hypotension


SNOMED Code(s): 21577557


   Code(s): I95.9 - HYPOTENSION, UNSPECIFIED   Status: Acute   Current Visit: 

Yes   





(3) Systemic inflammatory response syndrome


SNOMED Code(s): 608024765


   Code(s): R65.10 - SIRS OF NON-INFECTIOUS ORIGIN W/O ACUTE ORGAN DYSFUNCTION 

  Status: Acute   Current Visit: Yes   





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


SNOMED Code(s): 65349017, 558002065


   Code(s): C67.9 - MALIGNANT NEOPLASM OF BLADDER, UNSPECIFIED; C79.51 - 

SECONDARY MALIGNANT NEOPLASM OF BONE   Status: Chronic   Current Visit: No   





(5) HTN (hypertension)


SNOMED Code(s): 73205874


   Code(s): I10 - ESSENTIAL (PRIMARY) HYPERTENSION   Status: Chronic   Current 

Visit: No   


Qualifiers: 


   Hypertension type: essential hypertension   Qualified Code(s): I10 - 

Essential (primary) hypertension   





(6) DALI (obstructive sleep apnea)


SNOMED Code(s): 34629632


   Code(s): G47.33 - OBSTRUCTIVE SLEEP APNEA (ADULT) (PEDIATRIC)   Status: 

Chronic   Current Visit: No   





- Problem List Review


Problem List Initiated/Reviewed/Updated: Yes





- My Orders


Last 24 Hours: 


My Active Orders





06/06/19 09:41


Transfuse Red Blood Cells [COMM] Routine 





06/06/19 09:42


Verify Patient Consent Obtain [RC] ASDIRECTED 





06/06/19 10:36


Communication Order [RC] PRN 





06/06/19 10:46


Cyclobenzaprine [Flexeril]   5 mg PO TID PRN 


Magnesium Hydroxide [Milk of Magnesia]   30 ml PO Q24H PRN 





06/06/19 11:00


Metoprolol Tartrate [Lopressor]   50 mg PO BID 





06/06/19 21:00


Latanoprost [Xalatan 0.005% Ophth Soln]   0 ml EYEBOTH BEDTIME 


Meclizine [Antivert]   50 mg PO BID 





06/07/19 07:30


Levothyroxine   25 mcg PO ACBREAKFAST 





06/07/19 08:30


Potassium Chloride [Klor-Con 10]   40 meq PO TID@0830,1200,1700 





06/07/19 09:00


Fluticasone Propionate [Flonase]   0 gm NASBOTH QAM 


Loratadine [Claritin]   10 mg PO QAM 





06/07/19 09:35


Chest 1V Frontal [CR] Urgent 





06/07/19 10:35


Pantoprazole [ProTONIX***]   40 mg PO ACBREAKFAST 





06/08/19 05:11


BMP [BASIC METABOLIC PANEL,BMP] [CHEM] AM 


CBC WITH AUTO DIFF [HEME] AM 





06/09/19 05:11


BMP [BASIC METABOLIC PANEL,BMP] [CHEM] AM 


CBC WITH AUTO DIFF [HEME] AM 














- Plan


Plan:: 





79 yo male admitted for pneumonia.





1. Healthcare associated Pneumonia: Continue treating with broad spectrum 

antibiotics with Zosyn and Azithromycin. Discontinue Vancomycin. Cultures no 

growth.  Repeat CXR stable. Will consult ST to evaluate swallowing.





2. Mildly elevated troponin: will continue to trend. Likely ischemic demand 

from hypotension





3. Hyponatremia: Stable. will continue hold HCTZ





4. Hypokalemia: replacing with PO KCL today, will recheck. Magnesium 1.7, 

replace with 2 gm IV today





5. Anemia: Hgb 9.8 today. Appears improved. Monitor.





6. Weakness: Doesn't ambulate much at [place] will consult PT for strengthening.





VTE prophylaxis: SCDs, reports possible blood in stool will hold off on Heparin 

and order Occult stool. 





Dispo: 2-3 days pending improvement

## 2019-06-08 VITALS — SYSTOLIC BLOOD PRESSURE: 106 MMHG | DIASTOLIC BLOOD PRESSURE: 69 MMHG

## 2019-06-08 LAB
CHLORIDE SERPL-SCNC: 101 MMOL/L (ref 98–107)
SODIUM SERPL-SCNC: 133 MMOL/L (ref 136–148)

## 2019-06-08 RX ADMIN — FLUTICASONE PROPIONATE SCH SPRAY: 50 SPRAY, METERED NASAL at 09:28

## 2019-06-08 NOTE — [id number]
<Allan Rowan - Last Filed: 06/08/19 11:35>





**Discharge Summary





- Hospital Course


Free Text/Narrative:: 





79 y/o male with history of metastatic bladder cancer who presented from [facility] for dyspnea and hypotension. Patient was admitted for pneumonia. 

He was started on broad spectrum antibiotics which included zosyn, azithromycin 

and vancomycin.  During this hospitalization, he was noted to be anemic with Hg 

7.0. Hemoccult was negative. He was transfused 2 units PRBCs which brought his 

Hg up to 10.0. His dyspnea improved. In addition, he was started on 

supplemental oxygen requiring 2 L O2 NC.  He was found to be aspirating and 

speech therapy was consulted which recommended NPO since he was silently 

aspirating. Findings were transmitted to the patient and his nephew who 

initially decided for barium swallow study but then declined it since the 

patient did not want to have it done. He wanted to eat regular food and 

understood that he is high risk for recurrent aspiration pneumonias and 

worsening pulmonary function. He wanted to go to [facility] on comfort 

care. He was discharged with Augmentin 875mg PO BID for 7 days and Morphine 5 

mg PO Q2H PRN for pain, dyspnea.





- Discharge Data


Discharge Date: 06/08/19


Discharge Disposition: DC/Tfer to [facility] 03


Condition: Poor





- Patient Summary/Data


Consults: 


 Consultations





06/07/19 09:43


Consult to Speech Language Pathology [SLP Evaluation and Treatment] [CONS] 

Routine 





06/07/19 10:59


Consult to Physical Therapy [PT Evaluation and Treatment] [CONS] Routine 














- Patient Instructions


Diet: Regular Diet as Tolerated


Diet, Other: regular diet for comfort as tolerated


Activity: As Tolerated


Notify Provider of: Fever, Increased Pain, Swelling and Redness, Nausea and/or 

Vomiting





- Discharge Plan


*PRESCRIPTION DRUG MONITORING PROGRAM REVIEWED*: Not Applicable


*COPY OF PRESCRIPTION DRUG MONITORING REPORT IN PATIENT AZUCENA: Not Applicable


Prescriptions/Med Rec: 


Amoxicillin/Potassium Clav [Augmentin 875-125 Tablet] 1 each PO BID 7 Days #14 

tablet


Morphine [Morphine 20 MG/ML Soln] 5 mg PO Q2H PRN #10 ml


 PRN Reason: Pain


Home Medications: 


 Home Meds





Triamterene/Hydrochlorothiazid [Triamterene-HCTZ 37.5-25 MG] 1 tab PO BID 06/04/

15 [History]


Fish Oil/Omega-3 Fatty Acids [Fish Oil 1,000 MG] 1 cap PO BID 05/18/17 [History]


Latanoprost [Xalatan 0.005% Ophth Soln] 1 drop EYEBOTH BEDTIME 05/18/17 [History

]


Levothyroxine 25 mcg PO ACBREAKFAST 12/08/18 [History]


Loratadine [Claritin] 10 mg PO QAM 04/09/19 [History]


Meclizine [Antivert] 50 mg PO BID 04/09/19 [History]


Metoprolol Tartrate 50 mg PO BID 04/09/19 [History]


Ondansetron [Zofran] 8 mg PO Q8H PRN 04/09/19 [History]


Potassium Chloride [Klor-Con 10] 20 meq PO QAM 04/09/19 [History]


Prochlorperazine Maleate [Compazine] 10 mg PO TID PRN 04/09/19 [History]


oxyCODONE 5 mg PO Q8H PRN #15 tab 04/11/19 [Rx]


Acetaminophen [Tylenol] 325 mg PO Q4H PRN 06/04/19 [History]


Cyclobenzaprine HCl 5 mg PO TID 06/04/19 [History]


Fluticasone Propionate [Flonase] 2 spray NASBOTH QAM 06/04/19 [History]


Magnesium Hydroxide [Milk of Magnesia] 30 ml PO Q24H PRN 06/04/19 [History]


Menthol [Biofreeze] 1 applic TOP BEDTIME 06/04/19 [History]


Montelukast [Singulair] 10 mg PO BEDTIME 06/04/19 [History]


Ranitidine HCl [Heartburn Relief] 150 mg PO DAILY PRN 06/04/19 [History]


Amoxicillin/Potassium Clav [Augmentin 875-125 Tablet] 1 each PO BID 7 Days #14 

tablet 06/08/19 [Rx]


Morphine [Morphine 20 MG/ML Soln] 5 mg PO Q2H PRN #10 ml 06/08/19 [Rx]








Oxygen Therapy Mode: Nasal Cannula


Oxygen Flow Rate (L/min): 2


Patient Handouts:  Amoxicillin; Clavulanic Acid tablets, Morphine oral solution





- Discharge Summary/Plan Comment


DC Time >30 min.: No





- Patient Data


Vitals - Most Recent: 


 Last Vital Signs











Temp  36.9 C   06/08/19 07:05


 


Pulse  112 H  06/08/19 09:26


 


Resp  16   06/08/19 07:05


 


BP  107/73   06/08/19 09:26


 


Pulse Ox  92 L  06/08/19 07:05











Weight - Most Recent: 84.9 kg


I&O - Last 24 hours: 


 Intake & Output











 06/07/19 06/08/19 06/08/19





 22:59 06:59 14:59


 


Intake Total 700 550 


 


Balance 700 550 











Lab Results - Last 24 hrs: 


 Laboratory Results - last 24 hr











  06/07/19 06/08/19 06/08/19 Range/Units





  13:00 05:40 05:40 


 


WBC   4.76   (4.0-11.0)  K/uL


 


RBC   3.71 L   (4.50-5.90)  M/uL


 


Hgb   10.3 L   (13.0-17.0)  g/dL


 


Hct   30.9 L   (38.0-50.0)  %


 


MCV   83.3   (80.0-98.0)  fL


 


MCH   27.8   (27.0-32.0)  pg


 


MCHC   33.3   (31.0-37.0)  g/dL


 


RDW Std Deviation   54.8   (28.0-62.0)  fl


 


RDW Coeff of Rachele   18 H   (11.0-15.0)  %


 


Plt Count   100 L   (150-400)  K/uL


 


MPV   9.30   (7.40-12.00)  fL


 


Neut % (Auto)   NP   


 


Lymph % (Auto)   NP   


 


Mono % (Auto)   NP   


 


Eos % (Auto)   NP   


 


Baso % (Auto)   NP   


 


Neut # (Auto)   NP   


 


Lymph # (Auto)   NP   


 


Mono # (Auto)   NP   


 


Eos # (Auto)   NP   


 


Baso # (Auto)   NP   


 


Add Manual Diff   YES   


 


Neutrophils % (Manual)   71   (48.0-80.0)  %


 


Band Neutrophils %   12   %


 


Lymphocytes % (Manual)   13 L   (16.0-40.0)  %


 


Monocytes % (Manual)   3   (0.0-15.0)  %


 


Eosinophils % (Manual)   1   (0.0-7.0)  %


 


Nucleated RBC %   0.6   /100WBC


 


Absolute Seg Neuts   3.4   (1.4-5.7)  


 


Band Neutrophils #   0.6   


 


Lymphocytes # (Manual)   0.6   (0.6-2.4)  


 


Monocytes # (Manual)   0.1   (0.0-0.8)  


 


Eosinophils # (Manual)   0.0   (0.0-0.7)  


 


Basophils # (Manual)   0.0   (0.0-0.1)  


 


Nucleated RBCs #   0   K/uL


 


Sodium    133 L  (136-148)  mmol/L


 


Potassium    4.1  (3.5-5.1)  mmol/L


 


Chloride    101  ()  mmol/L


 


Carbon Dioxide    27.2  (21.0-32.0)  mmol/L


 


BUN    16  (7.0-18.0)  mg/dL


 


Creatinine    0.4 L  (0.8-1.3)  mg/dL


 


Est Cr Clr Drug Dosing    149.75  mL/min


 


Estimated GFR (MDRD)    > 60.0  ml/min


 


Glucose    82  ()  mg/dL


 


Calcium    6.7 L  (8.5-10.1)  mg/dL


 


Vancomycin Trough  9.2    (5.0-10.0)  ug/mL











SHADY Results - Last 24 hrs: 


 Microbiology











 06/04/19 12:41 Aerobic Blood Culture - Preliminary





 Blood - Venous - Lab Draw    NO GROWTH AFTER 3 DAYS





 Anaerobic Blood Culture - Final


 


 06/04/19 12:23 Aerobic Blood Culture - Preliminary





 Blood - Venous    NO GROWTH AFTER 3 DAYS





 Anaerobic Blood Culture - Final











Med Orders - Current: 


 Current Medications





Albuterol/Ipratropium (Duoneb 3.0-0.5 Mg/3 Ml)  3 ml NEB Q4HRRT PRN


   PRN Reason: SOB/wheezing


   Last Admin: 06/06/19 15:19 Dose:  3 ml


Bisacodyl (Dulcolax)  10 mg RECTAL DAILY PRN


   PRN Reason: Constipation


Cyclobenzaprine HCl (Flexeril)  5 mg PO TID PRN


   PRN Reason: muscle spasms


Enoxaparin Sodium (Lovenox)  40 mg SUBCUT Q24H [facility]


   Last Admin: 06/08/19 09:21 Dose:  Not Given


Fluticasone Propionate (Flonase)  0 gm NASBOTH QAM [facility]


   Last Admin: 06/08/19 09:28 Dose:  1 spray


Piperacillin Sod/Tazobactam (Sod 3.375 gm/ Sodium Chloride)  50 mls @ 100 mls/

hr IV Q6H [facility]


   Last Admin: 06/08/19 06:55 Dose:  100 mls/hr


Azithromycin 500 mg/ Sodium (Chloride)  250 mls @ 250 mls/hr IV Q24H [facility]


   Last Admin: 06/08/19 09:34 Dose:  250 mls/hr


Latanoprost (Xalatan 0.005% Ophth Soln)  0 ml EYEBOTH BEDTIME [facility]


   Last Admin: 06/07/19 21:00 Dose:  1 drop


Levothyroxine Sodium (Levothyroxine)  25 mcg PO ACBREAKFAST [facility]


   Last Admin: 06/08/19 06:55 Dose:  25 mcg


Loratadine (Claritin)  10 mg PO QAM [facility]


   Last Admin: 06/08/19 09:27 Dose:  10 mg


Magnesium Hydroxide (Milk Of Magnesia)  30 ml PO Q24H PRN


   PRN Reason: Constipation


Meclizine HCl (Antivert)  50 mg PO BID [facility]


   Last Admin: 06/08/19 09:27 Dose:  50 mg


Metoprolol Tartrate (Lopressor)  50 mg PO BID [facility]


   Last Admin: 06/08/19 09:26 Dose:  Not Given


Ondansetron HCl (Zofran)  8 mg PO Q8H PRN


   PRN Reason: Nausea/Vomiting


Oxycodone HCl (Oxycodone)  5 mg PO Q8H PRN


   PRN Reason: Pain


   Last Admin: 06/08/19 07:28 Dose:  5 mg


Pantoprazole Sodium (Protonix***)  40 mg PO ACBREAKFAST [facility]


   Last Admin: 06/08/19 06:55 Dose:  40 mg





Discontinued Medications





Aspirin (Aspirin)  325 mg PO ONETIME ONE


   Stop: 06/05/19 01:06


   Last Admin: 06/05/19 01:39 Dose:  325 mg


Furosemide (Lasix)  20 mg IVPUSH ONCALL ONE


   Stop: 06/06/19 10:32


   Last Admin: 06/06/19 15:11 Dose:  20 mg


Furosemide (Lasix) Confirm Administered Dose 20 mg .ROUTE .STK-MED ONE


   Stop: 06/06/19 15:03


   Last Admin: 06/06/19 15:07 Dose:  Not Given


Piperacillin Sod/Tazobactam (Sod 3.375 gm/ Sodium Chloride)  50 mls @ 100 mls/

hr IV ONETIME ONE


   Stop: 06/04/19 12:44


   Last Admin: 06/04/19 12:30 Dose:  100 mls/hr


Sodium Chloride (Normal Saline)  1,000 mls @ 125 mls/hr IV STAT [facility]


   Last Admin: 06/04/19 12:30 Dose:  125 mls/hr


Vancomycin HCl 1 gm/ Sodium (Chloride)  250 mls @ 250 mls/hr IV ONETIME ONE


   Stop: 06/04/19 13:14


   Last Admin: 06/04/19 13:19 Dose:  Not Given


Vancomycin HCl 1 gm/ Sodium (Chloride)  250 mls @ 250 mls/hr IV ONETIME ONE


   Stop: 06/04/19 13:59


   Last Admin: 06/04/19 13:44 Dose:  250 mls/hr


Sodium Chloride (Normal Saline)  1,000 mls @ 125 mls/hr IV ASDIRECTED [facility]


   Last Admin: 06/06/19 04:23 Dose:  125 mls/hr


Vancomycin HCl 1 gm/ Sodium (Chloride)  250 mls @ 166 mls/hr IV Q12H [facility]


   Last Admin: 06/05/19 00:35 Dose:  166 mls/hr


Azithromycin 500 mg/ Sodium (Chloride)  250 mls @ 250 mls/hr IV Q24H [facility]


   Last Admin: 06/05/19 21:45 Dose:  250 mls/hr


Vancomycin HCl 1 gm/ Sodium (Chloride)  250 mls @ 166 mls/hr IV Q12H [facility]


   Last Admin: 06/07/19 14:44 Dose:  Not Given


Sodium Chloride (Normal Saline)  500 mls @ 999 mls/hr IV STAT [facility]


   Stop: 06/05/19 08:46


Magnesium Sulfate 2 gm/ Premix  50 mls @ 50 mls/hr IV ONETIME ONE


   Stop: 06/07/19 12:04


   Last Admin: 06/07/19 12:09 Dose:  50 mls/hr


Potassium Chloride (Klor-Con M20)  40 meq PO ONETIME ONE


   Stop: 06/04/19 18:12


   Last Admin: 06/04/19 18:42 Dose:  40 meq


Potassium Chloride (Klor-Con M20)  40 meq PO BID@0830,1200 [facility]


   Stop: 06/05/19 12:01


   Last Admin: 06/05/19 12:49 Dose:  40 meq


Potassium Chloride (Klor-Con 10)  40 meq PO TID@0900,1200,1700 [facility]


   Stop: 06/06/19 17:01


   Last Admin: 06/06/19 17:58 Dose:  40 meq


Potassium Chloride (Klor-Con 10)  40 meq PO TID@0830,1200,1700 [facility]


   Stop: 06/07/19 17:01


   Last Admin: 06/07/19 16:58 Dose:  40 meq


Vancomycin HCl (Pharmacy To Dose - Vancomycin)  1 dose .XX ASDIRECTED [facility]











<Wei Guadarrama - Last Filed: 06/09/19 19:28>





**Discharge Summary





- Patient Summary/Data


Consults: 


 Consultations





06/07/19 09:43


Consult to Speech Language Pathology [SLP Evaluation and Treatment] [CONS] 

Routine 





06/07/19 10:59


Consult to Physical Therapy [PT Evaluation and Treatment] [CONS] Routine 














- Patient Data


Vitals - Most Recent: 


 Last Vital Signs











Temp  35.7 C   06/08/19 12:00


 


Pulse  111 H  06/08/19 12:00


 


Resp  22 H  06/08/19 12:00


 


BP  106/69   06/08/19 12:00


 


Pulse Ox  92 L  06/08/19 12:00











SHADY Results - Last 24 hrs: 


 Microbiology











 06/04/19 12:41 Aerobic Blood Culture - Final





 Blood - Venous - Lab Draw    NO GROWTH AFTER 5 DAYS





 Anaerobic Blood Culture - Final


 


 06/04/19 12:23 Aerobic Blood Culture - Final





 Blood - Venous    NO GROWTH AFTER 5 DAYS





 Anaerobic Blood Culture - Final











Med Orders - Current: 


 Current Medications








Discontinued Medications





Albuterol/Ipratropium (Duoneb 3.0-0.5 Mg/3 Ml)  3 ml NEB Q4HRRT PRN


   PRN Reason: SOB/wheezing


   Last Admin: 06/06/19 15:19 Dose:  3 ml


Aspirin (Aspirin)  325 mg PO ONETIME ONE


   Stop: 06/05/19 01:06


   Last Admin: 06/05/19 01:39 Dose:  325 mg


Bisacodyl (Dulcolax)  10 mg RECTAL DAILY PRN


   PRN Reason: Constipation


Cyclobenzaprine HCl (Flexeril)  5 mg PO TID PRN


   PRN Reason: muscle spasms


Enoxaparin Sodium (Lovenox)  40 mg SUBCUT Q24H [facility]


   Last Admin: 06/08/19 09:21 Dose:  Not Given


Fluticasone Propionate (Flonase)  0 gm NASBOTH QAM [facility]


   Last Admin: 06/08/19 09:28 Dose:  1 spray


Furosemide (Lasix)  20 mg IVPUSH ONCALL ONE


   Stop: 06/06/19 10:32


   Last Admin: 06/06/19 15:11 Dose:  20 mg


Furosemide (Lasix) Confirm Administered Dose 20 mg .ROUTE .STK-MED ONE


   Stop: 06/06/19 15:03


   Last Admin: 06/06/19 15:07 Dose:  Not Given


Heparin Sodium (Porcine) (Heparin Lock Flush 100 Units/Ml)  500 units FLUSH 

ASDIRECTED ONE


   Stop: 06/08/19 13:16


   Last Admin: 06/08/19 13:48 Dose:  500 units


Piperacillin Sod/Tazobactam (Sod 3.375 gm/ Sodium Chloride)  50 mls @ 100 mls/

hr IV ONETIME ONE


   Stop: 06/04/19 12:44


   Last Admin: 06/04/19 12:30 Dose:  100 mls/hr


Sodium Chloride (Normal Saline)  1,000 mls @ 125 mls/hr IV STAT [facility]


   Last Admin: 06/04/19 12:30 Dose:  125 mls/hr


Vancomycin HCl 1 gm/ Sodium (Chloride)  250 mls @ 250 mls/hr IV ONETIME ONE


   Stop: 06/04/19 13:14


   Last Admin: 06/04/19 13:19 Dose:  Not Given


Vancomycin HCl 1 gm/ Sodium (Chloride)  250 mls @ 250 mls/hr IV ONETIME ONE


   Stop: 06/04/19 13:59


   Last Admin: 06/04/19 13:44 Dose:  250 mls/hr


Sodium Chloride (Normal Saline)  1,000 mls @ 125 mls/hr IV ASDIRECTED [facility]


   Last Admin: 06/06/19 04:23 Dose:  125 mls/hr


Piperacillin Sod/Tazobactam (Sod 3.375 gm/ Sodium Chloride)  50 mls @ 100 mls/

hr IV Q6H [facility]


   Last Admin: 06/08/19 14:43 Dose:  Not Given


Vancomycin HCl 1 gm/ Sodium (Chloride)  250 mls @ 166 mls/hr IV Q12H [facility]


   Last Admin: 06/05/19 00:35 Dose:  166 mls/hr


Azithromycin 500 mg/ Sodium (Chloride)  250 mls @ 250 mls/hr IV Q24H [facility]


   Last Admin: 06/05/19 21:45 Dose:  250 mls/hr


Vancomycin HCl 1 gm/ Sodium (Chloride)  250 mls @ 166 mls/hr IV Q12H [facility]


   Last Admin: 06/07/19 14:44 Dose:  Not Given


Sodium Chloride (Normal Saline)  500 mls @ 999 mls/hr IV STAT [facility]


   Stop: 06/05/19 08:46


Azithromycin 500 mg/ Sodium (Chloride)  250 mls @ 250 mls/hr IV Q24H [facility]


   Last Admin: 06/08/19 09:34 Dose:  250 mls/hr


Magnesium Sulfate 2 gm/ Premix  50 mls @ 50 mls/hr IV ONETIME ONE


   Stop: 06/07/19 12:04


   Last Admin: 06/07/19 12:09 Dose:  50 mls/hr


Latanoprost (Xalatan 0.005% Ophth Soln)  0 ml EYEBOTH BEDTIME [facility]


   Last Admin: 06/07/19 21:00 Dose:  1 drop


Levothyroxine Sodium (Levothyroxine)  25 mcg PO ACBREAKFAST [facility]


   Last Admin: 06/08/19 06:55 Dose:  25 mcg


Loratadine (Claritin)  10 mg PO QAM [facility]


   Last Admin: 06/08/19 09:27 Dose:  10 mg


Magnesium Hydroxide (Milk Of Magnesia)  30 ml PO Q24H PRN


   PRN Reason: Constipation


Meclizine HCl (Antivert)  50 mg PO BID [facility]


   Last Admin: 06/08/19 09:27 Dose:  50 mg


Metoprolol Tartrate (Lopressor)  50 mg PO BID [facility]


   Last Admin: 06/08/19 09:26 Dose:  Not Given


Ondansetron HCl (Zofran)  8 mg PO Q8H PRN


   PRN Reason: Nausea/Vomiting


Oxycodone HCl (Oxycodone)  5 mg PO Q8H PRN


   PRN Reason: Pain


   Last Admin: 06/08/19 07:28 Dose:  5 mg


Pantoprazole Sodium (Protonix***)  40 mg PO ACBREAKFAST [facility]


   Last Admin: 06/08/19 06:55 Dose:  40 mg


Potassium Chloride (Klor-Con M20)  40 meq PO ONETIME ONE


   Stop: 06/04/19 18:12


   Last Admin: 06/04/19 18:42 Dose:  40 meq


Potassium Chloride (Klor-Con M20)  40 meq PO BID@0830,1200 [facility]


   Stop: 06/05/19 12:01


   Last Admin: 06/05/19 12:49 Dose:  40 meq


Potassium Chloride (Klor-Con 10)  40 meq PO TID@0900,1200,1700 [facility]


   Stop: 06/06/19 17:01


   Last Admin: 06/06/19 17:58 Dose:  40 meq


Potassium Chloride (Klor-Con 10)  40 meq PO TID@0830,1200,1700 [facility]


   Stop: 06/07/19 17:01


   Last Admin: 06/07/19 16:58 Dose:  40 meq


Vancomycin HCl (Pharmacy To Dose - Vancomycin)  1 dose .XX ASDIRECTED [facility]











- Free Text/Narrative


Note: 





I have examined the patient.  I have discussed findings and treatment plan with 

the resident.  I agree with the assessment and plan outlined in the following 

resident's note.